# Patient Record
Sex: FEMALE | Race: WHITE | Employment: FULL TIME | URBAN - METROPOLITAN AREA
[De-identification: names, ages, dates, MRNs, and addresses within clinical notes are randomized per-mention and may not be internally consistent; named-entity substitution may affect disease eponyms.]

---

## 2017-03-09 ENCOUNTER — TELEPHONE (OUTPATIENT)
Dept: OBGYN CLINIC | Facility: CLINIC | Age: 27
End: 2017-03-09

## 2017-04-17 ENCOUNTER — OFFICE VISIT (OUTPATIENT)
Dept: OBGYN CLINIC | Facility: CLINIC | Age: 27
End: 2017-04-17

## 2017-04-17 VITALS
DIASTOLIC BLOOD PRESSURE: 72 MMHG | BODY MASS INDEX: 25.46 KG/M2 | HEART RATE: 67 BPM | HEIGHT: 64.5 IN | WEIGHT: 151 LBS | SYSTOLIC BLOOD PRESSURE: 109 MMHG

## 2017-04-17 DIAGNOSIS — R10.2 PELVIC PAIN IN FEMALE: ICD-10-CM

## 2017-04-17 DIAGNOSIS — N89.8 FOUL SMELLING VAGINAL DISCHARGE: ICD-10-CM

## 2017-04-17 DIAGNOSIS — R87.612 PAPANICOLAOU SMEAR OF CERVIX WITH LOW GRADE SQUAMOUS INTRAEPITHELIAL LESION (LGSIL): ICD-10-CM

## 2017-04-17 DIAGNOSIS — Z30.431 IUD CHECK UP: ICD-10-CM

## 2017-04-17 DIAGNOSIS — Z01.419 ENCOUNTER FOR GYNECOLOGICAL EXAMINATION WITHOUT ABNORMAL FINDING: Primary | ICD-10-CM

## 2017-04-17 DIAGNOSIS — N92.3 INTERMENSTRUAL BLEEDING: ICD-10-CM

## 2017-04-17 PROCEDURE — 99213 OFFICE O/P EST LOW 20 MIN: CPT | Performed by: OBSTETRICS & GYNECOLOGY

## 2017-04-17 PROCEDURE — 99395 PREV VISIT EST AGE 18-39: CPT | Performed by: OBSTETRICS & GYNECOLOGY

## 2017-04-17 NOTE — PROGRESS NOTES
Mini Rodríguez is a 27year old female  Patient's last menstrual period was 2017. Patient presents with:  Gyn Exam: ANNUAL EXAM  Vaginal Problem: constant vaginal odor. Has random spotting. odor not linked w/ coitus.  Current partner w/ vasect Comment: seldom    Drug Use: No    Sexual Activity: Yes    Birth Control/ Protection: Mirena     Other Topics Concern    Caffeine Concern No     Social History Narrative       FAMILY HISTORY:  Family History   Problem Relation Age of Onset   • Breast Cance masses, tenderness, asymmetry, nipple discharge, nipple retraction or skin changes  Abdomen:  soft, nontender, nondistended, no masses  Skin/Hair: no unusual rashes or bruises  Extremities: no edema, no cyanosis  Psychiatric:  Oriented to time, place, pers

## 2017-04-24 NOTE — PROGRESS NOTES
Quick Note:    Pt advised of results and recs and verbalized understanding.  Pt transferred to [facility] to schedule appt.  ______

## 2017-06-05 ENCOUNTER — TELEPHONE (OUTPATIENT)
Dept: OBGYN CLINIC | Facility: CLINIC | Age: 27
End: 2017-06-05

## 2017-06-05 NOTE — TELEPHONE ENCOUNTER
Pt had to miki her colpo was chicho for td and she missed appt miki w her .  Dr Deo Parker for wednesday; would like to know how much can she eat before or anything she has to prepare for? Okay to lv  if no answer.  [phone]

## 2017-06-06 NOTE — TELEPHONE ENCOUNTER
Pt asking if there is anything special she should do to prep for her colpo. Pt states she has a very low pain tolerance and her IUD insertion was very painful. Advised pt to take 600 mg of motrin one hour prior to her appt with some food.  Other than that s

## 2017-06-07 ENCOUNTER — OFFICE VISIT (OUTPATIENT)
Dept: OBGYN CLINIC | Facility: CLINIC | Age: 27
End: 2017-06-07

## 2017-06-07 VITALS
HEART RATE: 62 BPM | DIASTOLIC BLOOD PRESSURE: 77 MMHG | SYSTOLIC BLOOD PRESSURE: 114 MMHG | WEIGHT: 147.19 LBS | BODY MASS INDEX: 25 KG/M2

## 2017-06-07 DIAGNOSIS — Z30.431 IUD CHECK UP: ICD-10-CM

## 2017-06-07 DIAGNOSIS — R10.2 PELVIC PAIN: ICD-10-CM

## 2017-06-07 DIAGNOSIS — R87.612 PAPANICOLAOU SMEAR OF CERVIX WITH LOW GRADE SQUAMOUS INTRAEPITHELIAL LESION (LGSIL): Primary | ICD-10-CM

## 2017-06-07 PROCEDURE — 88305 TISSUE EXAM BY PATHOLOGIST: CPT | Performed by: OBSTETRICS & GYNECOLOGY

## 2017-06-07 PROCEDURE — 57454 BX/CURETT OF CERVIX W/SCOPE: CPT | Performed by: OBSTETRICS & GYNECOLOGY

## 2017-06-07 NOTE — PROCEDURES
Colpo w/Cx Biopsy and ECC      Birth control method(s) used: Mirena IUD    Consent signed. Procedure discussed with patient in detail including indication, risk, benefits, alternatives and complications.     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

## 2017-06-16 ENCOUNTER — TELEPHONE (OUTPATIENT)
Dept: OBGYN CLINIC | Facility: CLINIC | Age: 27
End: 2017-06-16

## 2017-06-18 NOTE — PROGRESS NOTES
Quick Note:    Inform pt that colpo CHRIS 1. Next step is repeating pap w/ HPV screen in 12 mos. Make appt now for 12 mos as \"annual / cotest\".   ______

## 2017-06-26 RX ORDER — KETOCONAZOLE 20 MG/ML
SHAMPOO TOPICAL
Qty: 120 ML | Refills: 0 | Status: SHIPPED | OUTPATIENT
Start: 2017-06-26 | End: 2020-09-14

## 2017-07-10 ENCOUNTER — TELEPHONE (OUTPATIENT)
Dept: OBGYN CLINIC | Facility: CLINIC | Age: 27
End: 2017-07-10

## 2017-07-10 NOTE — TELEPHONE ENCOUNTER
Pt stated that her last herpes outbreak was about 1 month ago. Pt stated that she is going through a move and \"a lot of other stressful things\" and wants to be \"preventative and proactive\" and have a refill of Valtrex now. Pt denies current outbreak.  P

## 2017-07-11 RX ORDER — VALACYCLOVIR HYDROCHLORIDE 500 MG/1
500 TABLET, FILM COATED ORAL 2 TIMES DAILY
Qty: 30 TABLET | Refills: 0 | Status: SHIPPED | OUTPATIENT
Start: 2017-07-11 | End: 2018-05-21 | Stop reason: ALTCHOICE

## 2017-07-31 ENCOUNTER — HOSPITAL ENCOUNTER (OUTPATIENT)
Dept: ULTRASOUND IMAGING | Age: 27
Discharge: HOME | End: 2017-07-31
Attending: OBSTETRICS & GYNECOLOGY
Payer: COMMERCIAL

## 2017-07-31 DIAGNOSIS — Z30.431 IUD CHECK UP: ICD-10-CM

## 2017-07-31 DIAGNOSIS — R10.2 PELVIC PAIN: ICD-10-CM

## 2017-07-31 PROCEDURE — 76856 US EXAM PELVIC COMPLETE: CPT | Performed by: OBSTETRICS & GYNECOLOGY

## 2017-07-31 PROCEDURE — 76830 TRANSVAGINAL US NON-OB: CPT | Performed by: OBSTETRICS & GYNECOLOGY

## 2018-05-19 ENCOUNTER — TELEPHONE (OUTPATIENT)
Dept: OBGYN CLINIC | Facility: CLINIC | Age: 28
End: 2018-05-19

## 2018-05-19 NOTE — TELEPHONE ENCOUNTER
Pt requesting Valtrex rx refill. Denies a current outbreak but want to have a rx on hand. States she started a new job as a  and will be flying soon.  Informed pt nurse will route message to [facility] and we will call her as soon as we have a resp

## 2018-05-21 RX ORDER — VALACYCLOVIR HYDROCHLORIDE 500 MG/1
500 TABLET, FILM COATED ORAL 2 TIMES DAILY
Qty: 6 TABLET | Refills: 0 | Status: SHIPPED | OUTPATIENT
Start: 2018-05-21 | End: 2020-09-14

## 2018-05-21 NOTE — TELEPHONE ENCOUNTER
Lm stating [facility] authorized rx refill and it has been sent to pt's pharmacy. Pt to call us if she has any questions.

## 2018-09-10 ENCOUNTER — TELEPHONE (OUTPATIENT)
Dept: OBGYN CLINIC | Facility: CLINIC | Age: 28
End: 2018-09-10

## 2018-09-10 NOTE — TELEPHONE ENCOUNTER
GABRIEL. Had colpo 1 year ago and is due back for annual/cotest. We cancelled her June appt d/t NJG's schedule so please help her get an appt that is convenient for her.

## 2018-10-05 ENCOUNTER — TELEPHONE (OUTPATIENT)
Dept: OBGYN CLINIC | Facility: CLINIC | Age: 28
End: 2018-10-05

## 2018-10-05 NOTE — TELEPHONE ENCOUNTER
Pt informed of NOMI's recs below. Pt aware that she is 17 months overdue for her Annual.  Informed pt that she could see a gyne where she is located or go to urgent care where her insurance allows.

## 2018-10-05 NOTE — TELEPHONE ENCOUNTER
Pt is having a herpes outbreak, which started yesterday. She is a  and lives in [place]. Last Annual 4/2017. Pt aware that she is due for an Annual.  Pt states that she is will either schedule it here at [place] or [place].   Pt states that she d

## 2018-10-05 NOTE — TELEPHONE ENCOUNTER
Pt states she is going to schedule an Annual.  It will either be here in [facility] with MD or in [address] [facility]. She states that she does have lay overs in [place] [facility], so she could still continue care with us if she decides to do Annual here.   She did not say she will not

## 2019-07-25 ENCOUNTER — TELEPHONE (OUTPATIENT)
Dept: INTERNAL MEDICINE CLINIC | Facility: CLINIC | Age: 29
End: 2019-07-25

## 2019-07-25 ENCOUNTER — OFFICE VISIT (OUTPATIENT)
Dept: INTERNAL MEDICINE CLINIC | Facility: CLINIC | Age: 29
End: 2019-07-25
Payer: COMMERCIAL

## 2019-07-25 VITALS
RESPIRATION RATE: 14 BRPM | HEART RATE: 60 BPM | DIASTOLIC BLOOD PRESSURE: 80 MMHG | BODY MASS INDEX: 26.12 KG/M2 | HEIGHT: 64.3 IN | WEIGHT: 153 LBS | OXYGEN SATURATION: 98 % | TEMPERATURE: 98 F | SYSTOLIC BLOOD PRESSURE: 110 MMHG

## 2019-07-25 DIAGNOSIS — Z00.00 ANNUAL PHYSICAL EXAM: Primary | ICD-10-CM

## 2019-07-25 DIAGNOSIS — Z71.84 TRAVEL ADVICE ENCOUNTER: ICD-10-CM

## 2019-07-25 PROCEDURE — 99395 PREV VISIT EST AGE 18-39: CPT | Performed by: INTERNAL MEDICINE

## 2019-07-25 NOTE — TELEPHONE ENCOUNTER
Please call patient and let her know that after she left I felt that I should have recommended that at her leisure she see dermatology with her father's history of melanoma.   She can see Dr. Mone Hawley or Dr. Alejandra Ibanez for regular skin exam.  I thought this wou

## 2019-07-25 NOTE — PROGRESS NOTES
Shahram Arreola is a 25year old female. HPI:   Patient presents with:  Physical: New patient Px, patient wants to discuss being up to day with vaccinations     Patient here for annual physical.  She feels well. She has no new complaints.   She has a te Use      Smoking status: Never Smoker      Smokeless tobacco: Never Used    Alcohol use:  Yes      Alcohol/week: 0.0 standard drinks      Comment: social    Drug use: No       REVIEW OF SYSTEMS:   GENERAL HEALTH:  feels well otherwise  RESPIRATORY:  Voices

## 2019-07-25 NOTE — TELEPHONE ENCOUNTER
Called and Relayed MD's message to patient---verbalized understanding  Contact information provided for Dr. Rohini Caldwell.

## 2019-08-15 ENCOUNTER — OFFICE VISIT (OUTPATIENT)
Dept: OBGYN CLINIC | Facility: CLINIC | Age: 29
End: 2019-08-15
Payer: COMMERCIAL

## 2019-08-15 VITALS
HEART RATE: 60 BPM | DIASTOLIC BLOOD PRESSURE: 83 MMHG | SYSTOLIC BLOOD PRESSURE: 136 MMHG | BODY MASS INDEX: 26.15 KG/M2 | WEIGHT: 153.19 LBS | HEIGHT: 64.3 IN

## 2019-08-15 DIAGNOSIS — N89.8 VAGINAL ODOR: ICD-10-CM

## 2019-08-15 DIAGNOSIS — Z12.4 SCREENING FOR MALIGNANT NEOPLASM OF CERVIX: ICD-10-CM

## 2019-08-15 DIAGNOSIS — Z01.419 WELL WOMAN EXAM: Primary | ICD-10-CM

## 2019-08-15 DIAGNOSIS — Z30.432 ENCOUNTER FOR IUD REMOVAL: ICD-10-CM

## 2019-08-15 PROCEDURE — 99395 PREV VISIT EST AGE 18-39: CPT | Performed by: OBSTETRICS & GYNECOLOGY

## 2019-08-15 PROCEDURE — 58301 REMOVE INTRAUTERINE DEVICE: CPT | Performed by: OBSTETRICS & GYNECOLOGY

## 2019-08-16 ENCOUNTER — TELEPHONE (OUTPATIENT)
Dept: OBGYN CLINIC | Facility: CLINIC | Age: 29
End: 2019-08-16

## 2019-08-16 LAB — HPV I/H RISK 1 DNA SPEC QL NAA+PROBE: POSITIVE

## 2019-08-16 RX ORDER — METRONIDAZOLE 500 MG/1
500 TABLET ORAL 2 TIMES DAILY
Qty: 14 TABLET | Refills: 0 | Status: SHIPPED | OUTPATIENT
Start: 2019-08-16 | End: 2019-08-23

## 2019-08-16 NOTE — TELEPHONE ENCOUNTER
----- Message from Lali Tanner DO sent at 8/16/2019  7:20 AM CDT -----  Please notify of +BV. Needs flagyl or metrogel.   Review etoh precautions

## 2019-08-16 NOTE — TELEPHONE ENCOUNTER
Pt informed of results and recs. Pt would like flagyl. Pt states she lived in [place] and will be back there tomorrow evening. She would like rx sent there.   She states she will be going ot a wedding this weekend and will be drinking, so she will start the m

## 2019-08-17 LAB
GENITAL VAGINOSIS SCREEN: POSITIVE
TRICHOMONAS SCREEN: NEGATIVE

## 2019-08-19 ENCOUNTER — TELEPHONE (OUTPATIENT)
Dept: OBGYN CLINIC | Facility: CLINIC | Age: 29
End: 2019-08-19

## 2019-08-19 DIAGNOSIS — Z32.02 PREGNANCY EXAMINATION OR TEST, NEGATIVE RESULT: Primary | ICD-10-CM

## 2019-08-19 NOTE — TELEPHONE ENCOUNTER
----- Message from Tony Gaytan DO sent at 8/19/2019 12:58 PM CDT -----  ASCUS/HPV+. Needs colpo. Schedule after she completed BV tx. I removed Mirena at last visit so will need serum HCG the day before.

## 2019-08-30 NOTE — TELEPHONE ENCOUNTER
Notified pt of results and recs. Pt had abnormal Pap before and does not have any questions. Colpo booked on 9/11 and pt is aware to do serum hcg the day before appt.

## 2019-09-07 NOTE — TELEPHONE ENCOUNTER
Pt informed she does not need to fast for her blood work. Pt wondering if the blood work is checking for everything. Pt informed it is only a preg test.  Pt asked if we can check everything. I asked pt what specifically she wants tested.   PT states her

## 2019-09-07 NOTE — TELEPHONE ENCOUNTER
Pt needs to come in to labs day before her colpo schd on 9/11, would like to know if she needs to fast or do anything specific before going to lab Monday evening.

## 2019-09-09 ENCOUNTER — APPOINTMENT (OUTPATIENT)
Dept: LAB | Facility: HOSPITAL | Age: 29
End: 2019-09-09
Attending: OBSTETRICS & GYNECOLOGY
Payer: COMMERCIAL

## 2019-09-09 DIAGNOSIS — Z32.02 PREGNANCY EXAMINATION OR TEST, NEGATIVE RESULT: ICD-10-CM

## 2019-09-09 LAB — HCG SERPL QL: NEGATIVE

## 2019-09-09 PROCEDURE — 36415 COLL VENOUS BLD VENIPUNCTURE: CPT

## 2019-09-09 PROCEDURE — 84703 CHORIONIC GONADOTROPIN ASSAY: CPT

## 2019-09-10 ENCOUNTER — OFFICE VISIT (OUTPATIENT)
Dept: OBGYN CLINIC | Facility: CLINIC | Age: 29
End: 2019-09-10
Payer: COMMERCIAL

## 2019-09-10 VITALS
WEIGHT: 154 LBS | BODY MASS INDEX: 26.29 KG/M2 | HEART RATE: 77 BPM | HEIGHT: 64 IN | DIASTOLIC BLOOD PRESSURE: 84 MMHG | SYSTOLIC BLOOD PRESSURE: 128 MMHG

## 2019-09-10 DIAGNOSIS — R87.610 ATYPICAL SQUAMOUS CELL CHANGES OF UNDETERMINED SIGNIFICANCE (ASCUS) ON CERVICAL CYTOLOGY WITH POSITIVE HIGH RISK HUMAN PAPILLOMA VIRUS (HPV): Primary | ICD-10-CM

## 2019-09-10 DIAGNOSIS — R87.810 ATYPICAL SQUAMOUS CELL CHANGES OF UNDETERMINED SIGNIFICANCE (ASCUS) ON CERVICAL CYTOLOGY WITH POSITIVE HIGH RISK HUMAN PAPILLOMA VIRUS (HPV): Primary | ICD-10-CM

## 2019-09-10 PROCEDURE — 57454 BX/CURETT OF CERVIX W/SCOPE: CPT | Performed by: OBSTETRICS & GYNECOLOGY

## 2019-09-10 NOTE — PROCEDURES
Colpo w/Cx Biopsy and ECC    Pregnancy Results: negative from blood test   Birth control method(s) used: n/a    Consent signed. Procedure discussed with patient in detail including indication, risk, benefits, alternatives and complications.     Indicatio

## 2020-04-03 ENCOUNTER — TELEPHONE (OUTPATIENT)
Dept: INTERNAL MEDICINE CLINIC | Facility: CLINIC | Age: 30
End: 2020-04-03

## 2020-04-03 NOTE — TELEPHONE ENCOUNTER
Please call pt  She is a , wondering if she can get tested for COVID-19  This past week pt said her chest was heavy and tight (not normal for her), no fever, no cough  Tasked to nursing

## 2020-04-03 NOTE — TELEPHONE ENCOUNTER
Respiratory infection triage:    Fever:  [x]  No fever  []  Fever>100.4    Cough:  [] Tight cough  [] Cough with exertion  [] Dry cough  [] Sputum production,   Breathing:  [x] Mild shortness of breath interfering with activity last week[] Wheezing  [x] Pain with deep breathing last week[] Using inhaler    Other symptoms:  [] Sore throat  [] Difficulty swallowing  [] Nasal drainage/congestion  [] Sinus congestion/pressure  [] Ear pain  [x] Body aches back and ribs[] Poor appetite  [] Loss of sense of smell   [] Loss of sense of taste  []Conjunctivitis? [x] Any recent travel?last trip 3/30 from [place] to [place] ( 6 different flights)[] Any sick contacts?unknown  [] Are you a healthcare worker? Patient is a  -should start work tomorrow and wants to know what to do ,patient also states she feels like she has a lump in the throat    ADDITIONAL NOTES:            Notified patient that we will route this message to the doctor and see what their recommendations would be. In the meantime, if anything worsens, they were advised to call back or seek emergent evaluation.

## 2020-04-03 NOTE — TELEPHONE ENCOUNTER
Discussed with patient. She is a . She has not traveled out of the country. She has not had any obvious exposure to cold. She has traveled back and forth to [place]. She has had no fevers. No coughing. She has had some shortness of breath that was associated with some \"chest tightness\" for 2 days but now today feels well. She has history of blood clots. Is a  she is not sedentary anyway. She has had some body aches but again feels well today. I told her that with current recommendations she would not qualify for COVID 19 test that is available through [facility].  She was comfortable with that. She feels well today and will monitor for any changing symptoms. I also discussed with her that I did think about blood clots but yet since she is not sedentary and her symptoms have resolved for now I do not think she needs to be tested for DVT or PE.   She was very comfortable with this thought process and will call if she gets any other symptoms

## 2020-05-30 ENCOUNTER — TELEPHONE (OUTPATIENT)
Dept: OBGYN CLINIC | Facility: CLINIC | Age: 30
End: 2020-05-30

## 2020-06-02 NOTE — TELEPHONE ENCOUNTER
Pt wanting an IUD, Mirena. Pt has annual 8-15-19 and had IUD removed. Pt has next annual scheduled 8/2020. Pt is not using any birth control now. Sent to [address] [place] [address] to call with day one and schedule IUD. Do you want a hcg the day before the IUD insertion? Ascus Pap and +HPV, 9/10/19 Neg Colpo  Informed pt to check for insurance coverage for IUD.

## 2020-06-03 RX ORDER — MISOPROSTOL 200 UG/1
TABLET ORAL
Qty: 1 TABLET | Refills: 0 | Status: SHIPPED | OUTPATIENT
Start: 2020-06-03 | End: 2020-09-14

## 2020-07-17 ENCOUNTER — TELEPHONE (OUTPATIENT)
Dept: OBGYN CLINIC | Facility: CLINIC | Age: 30
End: 2020-07-17

## 2020-07-17 NOTE — TELEPHONE ENCOUNTER
Would like to schedule Mirena IUD insertion. Pt states she spoke with insurance and they do cover the device.  LMP 7/1    Pls advise

## 2020-07-17 NOTE — TELEPHONE ENCOUNTER
Informed pt that she would need to call with next period day one for the IUD insertion. Pt states she will call with day one. Pt will need to take the cytotec the night before her IUD insertion. Pt will need to do an upt the day of IUD.       Note below

## 2020-07-29 NOTE — TELEPHONE ENCOUNTER
Pt states that she will take the appt on Monday, 8/3 at noon. She stated by some chance she can not get off, she will call and cancel the appt .      Started to remind pt of taking Cytotec the night before and she stated she is on the bus and will call devan

## 2020-08-03 ENCOUNTER — OFFICE VISIT (OUTPATIENT)
Dept: OBGYN CLINIC | Facility: CLINIC | Age: 30
End: 2020-08-03
Payer: COMMERCIAL

## 2020-08-03 VITALS — WEIGHT: 159.63 LBS | BODY MASS INDEX: 27 KG/M2

## 2020-08-03 DIAGNOSIS — Z30.430 ENCOUNTER FOR IUD INSERTION: Primary | ICD-10-CM

## 2020-08-03 DIAGNOSIS — Z32.02 PREGNANCY EXAMINATION OR TEST, NEGATIVE RESULT: ICD-10-CM

## 2020-08-03 LAB
CONTROL LINE PRESENT WITH A CLEAR BACKGROUND (YES/NO): YES YES/NO
KIT EXPIRATION DATE: NORMAL DATE
KIT LOT #: NORMAL NUMERIC

## 2020-08-03 PROCEDURE — 58300 INSERT INTRAUTERINE DEVICE: CPT | Performed by: OBSTETRICS & GYNECOLOGY

## 2020-08-03 PROCEDURE — 81025 URINE PREGNANCY TEST: CPT | Performed by: OBSTETRICS & GYNECOLOGY

## 2020-08-03 NOTE — PROCEDURES
IUD Insertion     Pregnancy Results: negative from urine test   Birth control method(s) used: None. LMP: 7/29/20  Consent signed. Procedure discussed with the patient in detail including indication, risks, benefits, alternatives and complications.     Pe

## 2020-09-03 ENCOUNTER — OFFICE VISIT (OUTPATIENT)
Dept: OBGYN CLINIC | Facility: CLINIC | Age: 30
End: 2020-09-03
Payer: COMMERCIAL

## 2020-09-03 VITALS
SYSTOLIC BLOOD PRESSURE: 126 MMHG | HEART RATE: 71 BPM | DIASTOLIC BLOOD PRESSURE: 86 MMHG | BODY MASS INDEX: 27 KG/M2 | WEIGHT: 157.19 LBS

## 2020-09-03 DIAGNOSIS — Z01.419 WELL WOMAN EXAM: Primary | ICD-10-CM

## 2020-09-03 DIAGNOSIS — T83.32XA INTRAUTERINE CONTRACEPTIVE DEVICE THREADS LOST, INITIAL ENCOUNTER: ICD-10-CM

## 2020-09-03 DIAGNOSIS — Z12.4 CERVICAL CANCER SCREENING: ICD-10-CM

## 2020-09-03 PROCEDURE — 3079F DIAST BP 80-89 MM HG: CPT | Performed by: OBSTETRICS & GYNECOLOGY

## 2020-09-03 PROCEDURE — 99395 PREV VISIT EST AGE 18-39: CPT | Performed by: OBSTETRICS & GYNECOLOGY

## 2020-09-03 PROCEDURE — 3074F SYST BP LT 130 MM HG: CPT | Performed by: OBSTETRICS & GYNECOLOGY

## 2020-09-03 NOTE — H&P
HPI:  The patient is a 31 yo F here for WWE. NO GYN c/o. Some brown dc since IUD placed. No pain or cramping. NO IC. Pt considering breast reduction.       LPS: 8/15/19 ASCUS HPV+ with neg colpo    Reviewed medical and surgical history below       OBST activity:        Days per week: Not on file        Minutes per session: Not on file      Stress: Not on file    Relationships      Social connections:        Talks on phone: Not on file        Gets together: Not on file        Attends Qu[employer] service: No taking: Reported on 8/3/2020 ), Disp: 6 tablet, Rfl: 0  •  KETOCONAZOLE 2 % External Shampoo, USE 2-3 TIMES A WEEK AS DIRECTED (Patient not taking: Reported on 8/3/2020), Disp: 120 mL, Rfl: 0    ALLERGIES:    Sulfa Antibiotics       HIVES      Review of Sy exam.    Diagnoses and all orders for this visit:    Well woman exam    Cervical cancer screening    Intrauterine contraceptive device threads lost, initial encounter  -     US PELVIS W EV (CPT=76856/05907); Future        1. WWE:   1.  Reviewed ASCCP guidel

## 2020-09-06 LAB — HPV I/H RISK 1 DNA SPEC QL NAA+PROBE: POSITIVE

## 2020-09-09 ENCOUNTER — TELEPHONE (OUTPATIENT)
Dept: OBGYN CLINIC | Facility: CLINIC | Age: 30
End: 2020-09-09

## 2020-09-09 NOTE — TELEPHONE ENCOUNTER
Pt informed of MAZs recs and verbalized understanding. Pt scheduled colpo appt with [address] [facility] [address] for 9/14. Pt advised to take 600mg ibuprofen about 30-60 min prior to appt. Pt has mirena IUD.  Pt advised NOMI may want UPT upon arrival to appt so be ready to give urin

## 2020-09-09 NOTE — TELEPHONE ENCOUNTER
----- Message from Emily Davila DO sent at 9/9/2020 11:42 AM CDT -----  LSIL/HPV+. Needs colpo ASAP.   Losing insurance at the end of the month so I want to get her in, in case any further interventions need to occur

## 2020-09-14 ENCOUNTER — OFFICE VISIT (OUTPATIENT)
Dept: OBGYN CLINIC | Facility: CLINIC | Age: 30
End: 2020-09-14
Payer: COMMERCIAL

## 2020-09-14 VITALS
BODY MASS INDEX: 27 KG/M2 | DIASTOLIC BLOOD PRESSURE: 83 MMHG | HEART RATE: 72 BPM | WEIGHT: 155 LBS | SYSTOLIC BLOOD PRESSURE: 132 MMHG

## 2020-09-14 DIAGNOSIS — R87.810 CERVICAL HIGH RISK HUMAN PAPILLOMAVIRUS (HPV) DNA TEST POSITIVE: ICD-10-CM

## 2020-09-14 DIAGNOSIS — R87.612 PAPANICOLAOU SMEAR OF CERVIX WITH LOW GRADE SQUAMOUS INTRAEPITHELIAL LESION (LGSIL): Primary | ICD-10-CM

## 2020-09-14 PROCEDURE — 57454 BX/CURETT OF CERVIX W/SCOPE: CPT | Performed by: OBSTETRICS & GYNECOLOGY

## 2020-09-14 PROCEDURE — 3079F DIAST BP 80-89 MM HG: CPT | Performed by: OBSTETRICS & GYNECOLOGY

## 2020-09-14 PROCEDURE — 3075F SYST BP GE 130 - 139MM HG: CPT | Performed by: OBSTETRICS & GYNECOLOGY

## 2020-09-15 ENCOUNTER — TELEPHONE (OUTPATIENT)
Dept: OBGYN CLINIC | Facility: CLINIC | Age: 30
End: 2020-09-15

## 2020-09-15 NOTE — TELEPHONE ENCOUNTER
Please cancel james's US appt for IUD check.   I was able to see the strings during her colpo yesterday

## 2020-09-15 NOTE — PROCEDURES
Colpo w/Cx Biopsy and ECC    Birth control method(s) used: Mirena IUD    Consent signed. Procedure discussed with patient in detail including indication, risk, benefits, alternatives and complications. Indication: LSIL/HPV+    Procedure:   The patient

## 2020-09-22 ENCOUNTER — TELEPHONE (OUTPATIENT)
Dept: OBGYN CLINIC | Facility: CLINIC | Age: 30
End: 2020-09-22

## 2020-09-22 DIAGNOSIS — Z20.822 ENCOUNTER FOR PREPROCEDURE SCREENING LABORATORY TESTING FOR COVID-19: Primary | ICD-10-CM

## 2020-09-22 DIAGNOSIS — Z01.812 ENCOUNTER FOR PREPROCEDURE SCREENING LABORATORY TESTING FOR COVID-19: Primary | ICD-10-CM

## 2020-09-22 NOTE — TELEPHONE ENCOUNTER
Assisted pt with scheduling appt for Leep for 9/28/2020 at 1120am with [address] Covid test ordered. Pt aware scheduling will contact pt to schedule Covid test. Advised pt if she does not hear from scheduling bu 9/24/2020 to let us know.  Pt verbalized Shields

## 2020-09-22 NOTE — TELEPHONE ENCOUNTER
----- Message from Steve Taylor DO sent at 9/22/2020  1:21 PM CDT -----  LSIL/HPV+ pap with CHRIS 1 and 2 on colpo. Long standing history of abnormal paps. Discussed surveillance with pap/colpo at 6 and 12 months vs LEEP due to CIN2. R/B reviewed.   P

## 2020-09-25 ENCOUNTER — LAB ENCOUNTER (OUTPATIENT)
Dept: LAB | Facility: HOSPITAL | Age: 30
End: 2020-09-25
Attending: OBSTETRICS & GYNECOLOGY
Payer: COMMERCIAL

## 2020-09-25 DIAGNOSIS — Z01.812 ENCOUNTER FOR PREPROCEDURE SCREENING LABORATORY TESTING FOR COVID-19: ICD-10-CM

## 2020-09-25 DIAGNOSIS — Z20.822 ENCOUNTER FOR PREPROCEDURE SCREENING LABORATORY TESTING FOR COVID-19: ICD-10-CM

## 2020-09-28 ENCOUNTER — LAB ENCOUNTER (OUTPATIENT)
Dept: LAB | Facility: HOSPITAL | Age: 30
End: 2020-09-28
Attending: OBSTETRICS & GYNECOLOGY
Payer: COMMERCIAL

## 2020-09-28 ENCOUNTER — TELEPHONE (OUTPATIENT)
Dept: OBGYN CLINIC | Facility: CLINIC | Age: 30
End: 2020-09-28

## 2020-09-28 DIAGNOSIS — Z20.822 ENCOUNTER FOR PREPROCEDURE SCREENING LABORATORY TESTING FOR COVID-19: ICD-10-CM

## 2020-09-28 DIAGNOSIS — Z01.812 ENCOUNTER FOR PREPROCEDURE SCREENING LABORATORY TESTING FOR COVID-19: ICD-10-CM

## 2020-09-28 LAB — SARS-COV-2 BY PCR: NOT DETECTED

## 2020-09-28 NOTE — TELEPHONE ENCOUNTER
NOMI stated that the results are not back for Covid. Pt is having a LEEP today with NOMI. If results are not back by 10:30 am today we will have to reschedule tomorrow per NOMI.   Pt will call back today at 10:30 am.

## 2020-09-28 NOTE — TELEPHONE ENCOUNTER
Wilbur Brewer stated that can not cancel the [facility] lab, because it is in process. Wilbur Brewer states they will have to credit her.

## 2020-09-28 NOTE — TELEPHONE ENCOUNTER
Appt canceled for today for the LEEP. Appt rescheduled  for tomorrow on 9/29 at 11:30 for a LEEP procedure. (Scheduled or LEEP in procedure and on NOMI's schedule.) . Called Maryse Rodriguez to cancel the Covid done with [facility], so pt will not be charged.

## 2020-09-28 NOTE — TELEPHONE ENCOUNTER
Amparo Pierre from the lab states that the Covid will not be available until tomorrow or Friday. She stated that the lab could do a Rapid today and they would order for the pt.       Sam stated to schedule the Leep for tomorrow at 11:30 am  Pt aware that she will

## 2020-09-29 ENCOUNTER — OFFICE VISIT (OUTPATIENT)
Dept: OBGYN CLINIC | Facility: CLINIC | Age: 30
End: 2020-09-29
Payer: COMMERCIAL

## 2020-09-29 VITALS
BODY MASS INDEX: 27 KG/M2 | HEART RATE: 64 BPM | SYSTOLIC BLOOD PRESSURE: 115 MMHG | WEIGHT: 159.19 LBS | DIASTOLIC BLOOD PRESSURE: 76 MMHG

## 2020-09-29 DIAGNOSIS — N87.1 CIN II (CERVICAL INTRAEPITHELIAL NEOPLASIA II): Primary | ICD-10-CM

## 2020-09-29 PROCEDURE — 57522 CONIZATION OF CERVIX: CPT | Performed by: OBSTETRICS & GYNECOLOGY

## 2020-09-29 PROCEDURE — 3074F SYST BP LT 130 MM HG: CPT | Performed by: OBSTETRICS & GYNECOLOGY

## 2020-09-29 PROCEDURE — 3078F DIAST BP <80 MM HG: CPT | Performed by: OBSTETRICS & GYNECOLOGY

## 2020-09-29 RX ORDER — LIDOCAINE HYDROCHLORIDE AND EPINEPHRINE BITARTRATE 20; .01 MG/ML; MG/ML
1.7 INJECTION, SOLUTION SUBCUTANEOUS ONCE
Status: SHIPPED | OUTPATIENT
Start: 2020-09-29

## 2020-09-29 NOTE — PROCEDURES
Colpo with Leep Cone Biopsy    Birth control method(s) used: IUD - Mirena    Consent signed. Procedure discussed with patient in detail including indication, risk, benefits, alternatives and complications.     Pre-Procedure:  Pre-Meds:  Ibuprofen    Cerv

## 2020-10-02 ENCOUNTER — TELEPHONE (OUTPATIENT)
Dept: OBGYN CLINIC | Facility: CLINIC | Age: 30
End: 2020-10-02

## 2020-10-02 NOTE — TELEPHONE ENCOUNTER
Patient calling back and stated Dr. Lupe Nova tried contacting her to go over some results? Stated this was yesterday. I don not see anything in regards to this.      Please advise

## 2020-10-02 NOTE — TELEPHONE ENCOUNTER
Pt aware Mortimer Lapidus will be back in the office Monday. States he left message that things looked ok but he wanted to explain. Pt is not worried and will await his call Monday.

## 2020-12-17 NOTE — TELEPHONE ENCOUNTER
Pt already cancelled US appt via my chart. Burow's Graft Text: The defect edges were debeveled with a #15 scalpel blade.  Given the location of the defect, shape of the defect, the proximity to free margins and the presence of a standing cone deformity a Burow's skin graft was deemed most appropriate. The standing cone was removed and this tissue was then trimmed to the shape of the primary defect. The adipose tissue was also removed until only dermis and epidermis were left.  The skin margins of the secondary defect were undermined to an appropriate distance in all directions utilizing iris scissors.  The secondary defect was closed with interrupted buried subcutaneous sutures.  The skin edges were then re-apposed with running  sutures.  The skin graft was then placed in the primary defect and oriented appropriately.

## 2021-03-15 DIAGNOSIS — Z23 NEED FOR VACCINATION: ICD-10-CM

## 2021-09-01 ENCOUNTER — TELEPHONE (OUTPATIENT)
Dept: INTERNAL MEDICINE CLINIC | Facility: CLINIC | Age: 31
End: 2021-09-01

## 2021-09-01 DIAGNOSIS — Z00.00 ANNUAL PHYSICAL EXAM: Primary | ICD-10-CM

## 2021-09-01 NOTE — TELEPHONE ENCOUNTER
Patient called today to scheduled her annual physical with Dr Zahida Gutierrez. Patient scheduled an annual physical for 9/13 at 0830. Patient is requesting an order for blood work to be placed to get done before her appointment.

## 2021-09-13 ENCOUNTER — LAB ENCOUNTER (OUTPATIENT)
Dept: LAB | Age: 31
End: 2021-09-13
Attending: INTERNAL MEDICINE
Payer: COMMERCIAL

## 2021-09-13 ENCOUNTER — OFFICE VISIT (OUTPATIENT)
Dept: INTERNAL MEDICINE CLINIC | Facility: CLINIC | Age: 31
End: 2021-09-13
Payer: COMMERCIAL

## 2021-09-13 VITALS
TEMPERATURE: 98 F | HEART RATE: 80 BPM | BODY MASS INDEX: 24.59 KG/M2 | HEIGHT: 64 IN | WEIGHT: 144 LBS | SYSTOLIC BLOOD PRESSURE: 106 MMHG | DIASTOLIC BLOOD PRESSURE: 68 MMHG | OXYGEN SATURATION: 99 %

## 2021-09-13 DIAGNOSIS — Z00.00 ANNUAL PHYSICAL EXAM: Primary | ICD-10-CM

## 2021-09-13 DIAGNOSIS — M54.50 LUMBAR BACK PAIN: ICD-10-CM

## 2021-09-13 DIAGNOSIS — Z00.00 ANNUAL PHYSICAL EXAM: ICD-10-CM

## 2021-09-13 DIAGNOSIS — G47.10 EXCESSIVE SLEEPINESS: ICD-10-CM

## 2021-09-13 LAB
ALBUMIN SERPL-MCNC: 4.2 G/DL (ref 3.4–5)
ALBUMIN/GLOB SERPL: 1.1 {RATIO} (ref 1–2)
ALP LIVER SERPL-CCNC: 45 U/L
ALT SERPL-CCNC: 27 U/L
ANION GAP SERPL CALC-SCNC: 6 MMOL/L (ref 0–18)
AST SERPL-CCNC: 23 U/L (ref 15–37)
BASOPHILS # BLD AUTO: 0.02 X10(3) UL (ref 0–0.2)
BASOPHILS NFR BLD AUTO: 0.4 %
BILIRUB SERPL-MCNC: 0.9 MG/DL (ref 0.1–2)
BUN BLD-MCNC: 10 MG/DL (ref 7–18)
BUN/CREAT SERPL: 11.5 (ref 10–20)
CALCIUM BLD-MCNC: 9.3 MG/DL (ref 8.5–10.1)
CHLORIDE SERPL-SCNC: 108 MMOL/L (ref 98–112)
CHOLEST SMN-MCNC: 204 MG/DL (ref ?–200)
CO2 SERPL-SCNC: 25 MMOL/L (ref 21–32)
CREAT BLD-MCNC: 0.87 MG/DL
DEPRECATED RDW RBC AUTO: 40.4 FL (ref 35.1–46.3)
EOSINOPHIL # BLD AUTO: 0.05 X10(3) UL (ref 0–0.7)
EOSINOPHIL NFR BLD AUTO: 0.9 %
ERYTHROCYTE [DISTWIDTH] IN BLOOD BY AUTOMATED COUNT: 12.3 % (ref 11–15)
GLOBULIN PLAS-MCNC: 3.8 G/DL (ref 2.8–4.4)
GLUCOSE BLD-MCNC: 94 MG/DL (ref 70–99)
HCT VFR BLD AUTO: 43.4 %
HDLC SERPL-MCNC: 62 MG/DL (ref 40–59)
HGB BLD-MCNC: 14.4 G/DL
IMM GRANULOCYTES # BLD AUTO: 0.01 X10(3) UL (ref 0–1)
IMM GRANULOCYTES NFR BLD: 0.2 %
LDLC SERPL CALC-MCNC: 131 MG/DL (ref ?–100)
LYMPHOCYTES # BLD AUTO: 1.54 X10(3) UL (ref 1–4)
LYMPHOCYTES NFR BLD AUTO: 27.7 %
M PROTEIN MFR SERPL ELPH: 8 G/DL (ref 6.4–8.2)
MCH RBC QN AUTO: 29.5 PG (ref 26–34)
MCHC RBC AUTO-ENTMCNC: 33.2 G/DL (ref 31–37)
MCV RBC AUTO: 88.9 FL
MONOCYTES # BLD AUTO: 0.65 X10(3) UL (ref 0.1–1)
MONOCYTES NFR BLD AUTO: 11.7 %
NEUTROPHILS # BLD AUTO: 3.28 X10 (3) UL (ref 1.5–7.7)
NEUTROPHILS # BLD AUTO: 3.28 X10(3) UL (ref 1.5–7.7)
NEUTROPHILS NFR BLD AUTO: 59.1 %
NONHDLC SERPL-MCNC: 142 MG/DL (ref ?–130)
OSMOLALITY SERPL CALC.SUM OF ELEC: 287 MOSM/KG (ref 275–295)
PATIENT FASTING Y/N/NP: YES
PATIENT FASTING Y/N/NP: YES
PLATELET # BLD AUTO: 225 10(3)UL (ref 150–450)
POTASSIUM SERPL-SCNC: 4.4 MMOL/L (ref 3.5–5.1)
RBC # BLD AUTO: 4.88 X10(6)UL
SODIUM SERPL-SCNC: 139 MMOL/L (ref 136–145)
TRIGL SERPL-MCNC: 61 MG/DL (ref 30–149)
TSI SER-ACNC: 1.24 MIU/ML (ref 0.36–3.74)
VIT B12 SERPL-MCNC: 715 PG/ML (ref 193–986)
VIT D+METAB SERPL-MCNC: 25.9 NG/ML (ref 30–100)
VLDLC SERPL CALC-MCNC: 11 MG/DL (ref 0–30)
WBC # BLD AUTO: 5.6 X10(3) UL (ref 4–11)

## 2021-09-13 PROCEDURE — 80061 LIPID PANEL: CPT

## 2021-09-13 PROCEDURE — 82306 VITAMIN D 25 HYDROXY: CPT

## 2021-09-13 PROCEDURE — 3078F DIAST BP <80 MM HG: CPT | Performed by: INTERNAL MEDICINE

## 2021-09-13 PROCEDURE — 36415 COLL VENOUS BLD VENIPUNCTURE: CPT

## 2021-09-13 PROCEDURE — 80053 COMPREHEN METABOLIC PANEL: CPT

## 2021-09-13 PROCEDURE — 84443 ASSAY THYROID STIM HORMONE: CPT

## 2021-09-13 PROCEDURE — 3008F BODY MASS INDEX DOCD: CPT | Performed by: INTERNAL MEDICINE

## 2021-09-13 PROCEDURE — 85025 COMPLETE CBC W/AUTO DIFF WBC: CPT

## 2021-09-13 PROCEDURE — 99395 PREV VISIT EST AGE 18-39: CPT | Performed by: INTERNAL MEDICINE

## 2021-09-13 PROCEDURE — 3074F SYST BP LT 130 MM HG: CPT | Performed by: INTERNAL MEDICINE

## 2021-09-13 PROCEDURE — 82607 VITAMIN B-12: CPT

## 2021-09-13 NOTE — PROGRESS NOTES
Oziel Hernandez is a 26year old female.   HPI:   Patient presents with:  Physical: Annual Px, Pain 3/10 to back since going back to work     Oswaldo presents for annual physical.    She explained that since teenage years in high school years she has been ab Voices no chest pain on exertion or shortness of breath  GI:   Voices no abdominal pain or changes of bowels   :Viices no urning or frequency of urination.   NEURO:  Voices no  headaches or dizziness    EXAM:   /68   Pulse 80   Temp 98 °F (36.7 °C)

## 2021-09-16 ENCOUNTER — TELEPHONE (OUTPATIENT)
Dept: INTERNAL MEDICINE CLINIC | Facility: CLINIC | Age: 31
End: 2021-09-16

## 2021-09-16 DIAGNOSIS — G47.9 SLEEP DISORDER: Primary | ICD-10-CM

## 2021-09-16 DIAGNOSIS — G47.10 EXCESSIVE SLEEPINESS: ICD-10-CM

## 2021-09-16 NOTE — TELEPHONE ENCOUNTER
Spoke to Alanis. Reviewed labs. Vitamin D just mildly low. She will let me know how much vitamin D supplement she takes.   Since labs did not show any culprit for her excessive sleepiness we did talk about seeing a pulmonologist was a sleep specialist.

## 2021-10-13 ENCOUNTER — OFFICE VISIT (OUTPATIENT)
Dept: OBGYN CLINIC | Facility: CLINIC | Age: 31
End: 2021-10-13
Payer: COMMERCIAL

## 2021-10-13 VITALS
SYSTOLIC BLOOD PRESSURE: 125 MMHG | WEIGHT: 146.38 LBS | BODY MASS INDEX: 25 KG/M2 | HEART RATE: 59 BPM | DIASTOLIC BLOOD PRESSURE: 77 MMHG

## 2021-10-13 DIAGNOSIS — Z01.419 WELL WOMAN EXAM: Primary | ICD-10-CM

## 2021-10-13 DIAGNOSIS — Z98.890 HISTORY OF LOOP ELECTROSURGICAL EXCISION PROCEDURE (LEEP): ICD-10-CM

## 2021-10-13 PROCEDURE — 99395 PREV VISIT EST AGE 18-39: CPT | Performed by: OBSTETRICS & GYNECOLOGY

## 2021-10-13 PROCEDURE — 3074F SYST BP LT 130 MM HG: CPT | Performed by: OBSTETRICS & GYNECOLOGY

## 2021-10-13 PROCEDURE — 3078F DIAST BP <80 MM HG: CPT | Performed by: OBSTETRICS & GYNECOLOGY

## 2021-10-13 NOTE — H&P
HPI:  The patient is a 27 yo F here for WWE. No menses with Mirena. Same sex relationship (new partner). Declines STD screen. Needs pap smear due to h/o LEEP last year. Occasional vaginal odor    LPS: 9/30/20 LSI? HPV+.   Colpo 9/14/20 with CHRIS 1 and Asked        Blood Transfusions: Not Asked        Caffeine Concern: No        Occupational Exposure: Not Asked        Hobby Hazards: Not Asked        Sleep Concern: Not Asked        Stress Concern: Not Asked        Weight Concern: Not Asked        Special Breast Cancer Other         familly h/o   • Diabetes Other         familly h/o   • Thyroid disease Other         familly h/o   • Hypertension Brother        MEDICATIONS:    Current Outpatient Medications:   •  Levonorgestrel 20 MCG/24HR Intrauterine IUD, 1 normal without masses or tenderness  Perineum: normal    Assessment/Plan:  Jerri Gutiérrez was seen today for gyn exam.    Diagnoses and all orders for this visit:    Well woman exam    History of loop electrosurgical excision procedure (LEEP)        1. WWE:   1.  R

## 2021-11-01 ENCOUNTER — OFFICE VISIT (OUTPATIENT)
Dept: OBGYN CLINIC | Facility: CLINIC | Age: 31
End: 2021-11-01
Payer: COMMERCIAL

## 2021-11-01 ENCOUNTER — TELEPHONE (OUTPATIENT)
Dept: OBGYN CLINIC | Facility: CLINIC | Age: 31
End: 2021-11-01

## 2021-11-01 VITALS
WEIGHT: 147 LBS | SYSTOLIC BLOOD PRESSURE: 130 MMHG | BODY MASS INDEX: 25 KG/M2 | DIASTOLIC BLOOD PRESSURE: 80 MMHG | HEART RATE: 53 BPM

## 2021-11-01 DIAGNOSIS — R87.810 CERVICAL HIGH RISK HUMAN PAPILLOMAVIRUS (HPV) DNA TEST POSITIVE: Primary | ICD-10-CM

## 2021-11-01 PROCEDURE — 3075F SYST BP GE 130 - 139MM HG: CPT | Performed by: OBSTETRICS & GYNECOLOGY

## 2021-11-01 PROCEDURE — 3079F DIAST BP 80-89 MM HG: CPT | Performed by: OBSTETRICS & GYNECOLOGY

## 2021-11-01 PROCEDURE — 57454 BX/CURETT OF CERVIX W/SCOPE: CPT | Performed by: OBSTETRICS & GYNECOLOGY

## 2021-11-01 NOTE — TELEPHONE ENCOUNTER
Pt has colpo scheduled at 11;20 with [address] wants to know if she can eat and should she take tylenol

## 2021-11-01 NOTE — TELEPHONE ENCOUNTER
Notified okay to eat and should take ibuprofen 600 mg 1 hr prior to appt, so can take now if she likes. Patient verbalized understanding. She does not have motrin, but will go pick some up right now.

## 2021-11-01 NOTE — PROCEDURES
Colpo with Endocervical Curettage    Birth control method(s) used: IUD    Consent signed. Procedure discussed with patient in detail including indication, risk, benefits, alternatives and complications.     Indication: History of LEEP last year; pap neg/

## 2021-12-08 ENCOUNTER — OFFICE VISIT (OUTPATIENT)
Dept: PULMONOLOGY | Facility: CLINIC | Age: 31
End: 2021-12-08
Payer: COMMERCIAL

## 2021-12-08 VITALS
HEART RATE: 70 BPM | BODY MASS INDEX: 25.93 KG/M2 | WEIGHT: 155.63 LBS | OXYGEN SATURATION: 100 % | RESPIRATION RATE: 16 BRPM | DIASTOLIC BLOOD PRESSURE: 71 MMHG | SYSTOLIC BLOOD PRESSURE: 115 MMHG | HEIGHT: 65 IN

## 2021-12-08 DIAGNOSIS — G47.33 OSA (OBSTRUCTIVE SLEEP APNEA): ICD-10-CM

## 2021-12-08 DIAGNOSIS — G47.10 HYPERSOMNIA: Primary | ICD-10-CM

## 2021-12-08 PROCEDURE — 3008F BODY MASS INDEX DOCD: CPT | Performed by: INTERNAL MEDICINE

## 2021-12-08 PROCEDURE — 3074F SYST BP LT 130 MM HG: CPT | Performed by: INTERNAL MEDICINE

## 2021-12-08 PROCEDURE — 99244 OFF/OP CNSLTJ NEW/EST MOD 40: CPT | Performed by: INTERNAL MEDICINE

## 2021-12-08 PROCEDURE — 3078F DIAST BP <80 MM HG: CPT | Performed by: INTERNAL MEDICINE

## 2021-12-08 NOTE — PROGRESS NOTES
[facility], [facility], [place]    Report of Consultation    Isatu Marcos Chasesara Patient Status:  Specimen    1990 MRN [id number]   Location [facility], [facility], [facility] Jaime Weir MD [facility]  General Surgery  Postprocedure Evaluation in Office  Pt Name: Love Meek  Date of Birth 1945   Today's Date: 6/21/2021  Medical Record Number: [id number]  Referring Provider: No ref. provider found  Primary Care Provider: Eduardo Macedo MD  Chief Complaint   Patient presents with   Nadia Jennifer Post-Op Check     s/p Repair recurrent left inguinal hernia with mesh 6/7/21     ASSESSMENT      Problem List Items Addressed This Visit     S/P left inguinal hernia repair - Primary           PLANS       Pathology reviewed with the patient who understands. All questions were answered. New Prescriptions    No medications on file     Patient Instructions   May return to full activity without restrictions. Follow up: Return if symptoms worsen or fail to improve. Orders Placed This Encounter:  No orders of the defined types were placed in this encounter. Garrett Griffin is seen today for post-op follow-up. He is 2 week(s) status post Left inguinal hernia repair. He is tolerating a regular diet, having regular bowel movements. Symptoms and activity have gradually improved compared to preoperative. The surgical site is clean and has no drainage. Pain is controlled without any medications. . He has compliant with postoperative instructions.   Past Medical History  Past Medical History:   Diagnosis Date    Arthritis     Diverticular hemorrhage     Dr. Dennise Mckeon History of blood transfusion     Hypertension      Past Surgical History  Past Surgical History:   Procedure Laterality Date    COLONOSCOPY      Dr. Noriega Serrossi one by Dr. Rodrigo Mendez in 2012   [address] N/A 6/7/2021    OPEN REPAIR OF RECURRENT LEF TINGUINAL HERNIA WITH MESH performed by Jaime Weir MD at [address]      Dr. Alicia Peguero     Medications  Current Outpatient Medications on File Prior to Visit   Medication Sig Dispense Refill    breast   • Hypertension Mother    • Stroke Paternal Grandfather         TIA's   • Breast Cancer Other         familly h/o   • Diabetes Other         familly h/o   • Thyroid disease Other         familly h/o   • Hypertension Brother        Social History  S Feeling of Stress :    Social Connections:     Frequency of Communication with Friends and Family:     Frequency of Social Gatherings with Friends and Family:     Attends Confucianism Services:     Active Member of Clubs or Organizations:     Attends Club or Organization Meetings:     Marital Status:    Intimate Partner Violence:     Fear of Current or Ex-Partner:     Emotionally Abused:     Physically Abused:     Sexually Abused:      Health Screening Exams  Health Maintenance   Topic Date Due    Hepatitis C screen  Never done    DTaP/Tdap/Td vaccine (1 - Tdap) 12/15/1962    Shingles Vaccine (1 of 2) Never done    Low dose CT lung screening  Never done    Pneumococcal 65+ years Vaccine (1 of 1 - PPSV23) Never done   [employer] Visit (AWV)  Never done    Flu vaccine (Season Ended) 09/01/2021    Creatinine monitoring  05/28/2022    Potassium monitoring  06/07/2022    COVID-19 Vaccine  Completed    Hepatitis A vaccine  Aged Out    Hepatitis B vaccine  Aged Out    Hib vaccine  Aged Out    Meningococcal (ACWY) vaccine  Aged Out     Review of Systems  History obtained from the patient. Constitutional: Denies any fever, chills, fatigue. Wound: Denies any rash, skin color changes or wound problems. Resp: Denies any cough, shortness of breath. CV: Denies any chest pain, orthopnea or syncope. GI: Positive for incisional discomfort only. Denies any nausea, vomiting, blood in the stool, constipation or diarrhea. : Denies any hematuria, hesitancy or dysuria. OBJECTIVE    VITALS:  height is 5' 7\" (1.702 m) and weight is 164 lb (74.4 kg). His temporal temperature is 97.1 °F (36.2 °C). His blood pressure is 122/64 and his pulse is 67. His respiration is 18 and oxygen saturation is 95%. CONSTITUTIONAL: Alert and oriented times 3, no acute distress and cooperative to examination. SKIN: Skin color, texture, turgor normal. No rashes or lesions.   INCISION: wound margins intact and healing Upper airway class II / no enlarged tonsils   No underline significant medical issue   No symptoms for Narcolepsy   Normal exam     Educated about MARLIN   Good sleep hygiene with regular sleep-wake cycles  Avoid TV in bedroom    Home sleep study  Continue well.  No signs of infection. No drainage. ABDOMEN: soft, nontender, nondistended, no masses or organomegaly. Bowel sounds normal. Inguinal curvilinear bilateral left groin scar present with prominent healing ridge. No sign of recurrence, hematoma or seroma. Tenderness to palpation incisional only.    NEUROLOGIC: No sensory or motor nerve irritation  MALE : bilateral testes normal, no scrotal swelling or edema               Electronically signed by Jessenia Wood MD MD FACS on 6/21/2021 at 1:10 PM

## 2022-01-18 ENCOUNTER — OFFICE VISIT (OUTPATIENT)
Dept: SLEEP CENTER | Age: 32
End: 2022-01-18
Attending: INTERNAL MEDICINE
Payer: COMMERCIAL

## 2022-01-18 DIAGNOSIS — G47.10 HYPERSOMNIA: ICD-10-CM

## 2022-01-18 DIAGNOSIS — G47.33 OSA (OBSTRUCTIVE SLEEP APNEA): ICD-10-CM

## 2022-01-18 PROCEDURE — 95806 SLEEP STUDY UNATT&RESP EFFT: CPT

## 2022-01-24 ENCOUNTER — TELEPHONE (OUTPATIENT)
Dept: INTERNAL MEDICINE CLINIC | Facility: CLINIC | Age: 32
End: 2022-01-24

## 2022-01-29 ENCOUNTER — TELEPHONE (OUTPATIENT)
Dept: INTERNAL MEDICINE CLINIC | Facility: CLINIC | Age: 32
End: 2022-01-29

## 2022-02-21 NOTE — TELEPHONE ENCOUNTER
1/26 Received message from answering service    Pt is requesting call back with Covid instructions & also needs a note for work     [phone]
As FYI to DR. OSHEA
I tried to leave message but her mailbox is full. I will send her a note through my chart regarding the above.
LMTCB with patient.
Left message to call back on cell #. No other alternate number listed. Per record review: patient viewed letter on 1/29/22.
Left message to call back to get more information. To Dr. Brooke Staton, awaiting call back from patient.
Left message to call back.
Left message to call back. When did she test positive?   To DORIE BURCH
Noted.
Noted.  Patient also sent a Streamline message to confirm receipt    To Dr. García Arms
Patient is calling back. Was asked if she received the letter sent in [address], patient verified that she did receive the letter. Patient also states she will respond to Dr Maura Costa via [address] with any further questions she may have.
Patient is calling to request a letter from Dr Thanh Ruth regarding why she missed work. Patient states her girlfriend (significant other) tested positivefor covid. Patient states she was having covid like symptoms and had gotten tested for covid on 1/20/2022.
Patient is calling today she tested positive with an at home test & PCR results    Patient needs a note for her work stating last week she was exposed to her girlfriend who is positive also the note needs to state she is not covid positive and will need to
Please call patient and ask her if she received my letter sent through [address] that she requested. In addition please confirm she received my message for her booster to not be J&J and her booster being either with [facility] Cheney on [address].   If it is with [facility]
Please let patient know according to the CDC the following guidelines for quarantine after exposure to somebody with Covid is as follows:    1. Looks like she got the J&J vaccine in March.   If that is the only vaccine that she got an is not boosted she sh
yes

## 2022-02-22 ENCOUNTER — OFFICE VISIT (OUTPATIENT)
Dept: SLEEP CENTER | Age: 32
End: 2022-02-22
Attending: INTERNAL MEDICINE
Payer: COMMERCIAL

## 2022-02-22 DIAGNOSIS — G47.33 OSA (OBSTRUCTIVE SLEEP APNEA): ICD-10-CM

## 2022-02-22 DIAGNOSIS — G47.10 HYPERSOMNIA: ICD-10-CM

## 2022-02-22 PROCEDURE — 95806 SLEEP STUDY UNATT&RESP EFFT: CPT

## 2022-02-25 NOTE — PROCEDURES
[address] [place] [address]  Accredited by the [facility] ([facility])    PATIENT'S NAME: Marija Santos   ATTENDING PHYSICIAN: Amador Jones. Maryjo Quezada MD   REFERRING PHYSICIAN: Amador Jones. Maryjo Quezada MD   PATIENT ACCOUNT #: [de-identified] LOCATION: [address] [place] [address] RECORD #: [id number] YOB: 1990   DATE OF STUDY: 02/22/2022       SLEEP STUDY REPORT    STUDY TYPE:  Home sleep test.    INDICATION:  Suspected obstructive sleep apnea (ICD-10 code G47.33), in a patient with snoring, hypersomnia, an [place] Sleepiness Scale score of 10/24, with chronic fatigue, witnessed apneic events, unrefreshing sleep, and a body mass index of 26.9. RESULTS:  The patient underwent home sleep test with measurement of her nasal airflow, nasal air pressure, snoring, chest and abdominal wall motion, oximetry, and body position. I have reviewed the entirety of the raw data of this study. During this study, the total recording time was 440 minutes. The lights-out clock time is 10:33 p.m.; the lights-on clock time is 5:53 a.m. There were 9 apneic events, of which all were obstructive, for an apnea index of 1.2 events per hour. There were 20 hypopneic events for a hypopnea index of 2.7 events per hour. The combined apnea plus hypopnea index is 3.9 events per hour. There was no significant hypoventilation, Cheyne-Alves breathing, or periodic breathing. The average oxygen saturation is 98%, the lowest oxygen saturation is 94%, and the average desaturation is 3%. The oxygen desaturation index is 2.9 events per hour. The patient spent 196 minutes in the supine position, equivalent to 45% of the testing, and 243 minutes in the non-supine position, equivalent to 55% of the testing. The average heart rate is 55 beats per minute. INTERPRETATION:  The data generated from this study shows no evidence of significant sleep-disordered breathing. Snoring is appreciated. RECOMMENDATIONS:    1.    Clinical correlation is required regarding this patient's excessive daytime somnolence. 2.   Avoid alcohol and sedating drug. 3.   Patient should not drive if at all sleepy. 4.   Would consider extending sleep time by 1 to 2 hours to assess for impact on daytime somnolence. 5.   Screen for hypothyroidism if not already performed. 6.   If narcolepsy is a diagnostic consideration, would proceed to multiple sleep latency testing. Please do not hesitate to contact me if there is any question whatsoever regarding interpretation of this study. Dictated By Sandra Interiano MD  d: 02/24/2022 17:36:08  t: 02/24/2022 18:01:00  [facility] [facility] [id number]  BK[facility]/    cc: Jhon Wade.  MD Shelia Conrad MD

## 2022-03-03 ENCOUNTER — TELEPHONE (OUTPATIENT)
Dept: PULMONOLOGY | Facility: CLINIC | Age: 32
End: 2022-03-03

## 2022-03-03 NOTE — TELEPHONE ENCOUNTER
Pt asking if she still needs to keep appt on 3/9 - since she has gone over sleep study results with RN

## 2022-03-04 NOTE — TELEPHONE ENCOUNTER
Patient still suffering from significant daytime sleepiness and sleep attack to follow-up with me otherwise no need to follow-up

## 2022-03-09 ENCOUNTER — OFFICE VISIT (OUTPATIENT)
Dept: PULMONOLOGY | Facility: CLINIC | Age: 32
End: 2022-03-09
Payer: COMMERCIAL

## 2022-03-09 VITALS
HEART RATE: 65 BPM | HEIGHT: 65 IN | BODY MASS INDEX: 25.83 KG/M2 | WEIGHT: 155 LBS | SYSTOLIC BLOOD PRESSURE: 123 MMHG | RESPIRATION RATE: 14 BRPM | OXYGEN SATURATION: 100 % | DIASTOLIC BLOOD PRESSURE: 83 MMHG

## 2022-03-09 DIAGNOSIS — G47.10 HYPERSOMNIA: Primary | ICD-10-CM

## 2022-03-09 PROCEDURE — 3074F SYST BP LT 130 MM HG: CPT | Performed by: INTERNAL MEDICINE

## 2022-03-09 PROCEDURE — 3079F DIAST BP 80-89 MM HG: CPT | Performed by: INTERNAL MEDICINE

## 2022-03-09 PROCEDURE — 99213 OFFICE O/P EST LOW 20 MIN: CPT | Performed by: INTERNAL MEDICINE

## 2022-03-09 PROCEDURE — 3008F BODY MASS INDEX DOCD: CPT | Performed by: INTERNAL MEDICINE

## 2022-03-21 ENCOUNTER — OFFICE VISIT (OUTPATIENT)
Dept: ALLERGY | Facility: CLINIC | Age: 32
End: 2022-03-21
Payer: COMMERCIAL

## 2022-03-21 ENCOUNTER — NURSE ONLY (OUTPATIENT)
Dept: ALLERGY | Facility: CLINIC | Age: 32
End: 2022-03-21
Payer: COMMERCIAL

## 2022-03-21 VITALS
RESPIRATION RATE: 20 BRPM | DIASTOLIC BLOOD PRESSURE: 88 MMHG | BODY MASS INDEX: 26.66 KG/M2 | SYSTOLIC BLOOD PRESSURE: 142 MMHG | WEIGHT: 160 LBS | HEART RATE: 78 BPM | HEIGHT: 65 IN | OXYGEN SATURATION: 100 %

## 2022-03-21 DIAGNOSIS — T78.1XXA ADVERSE FOOD REACTION, INITIAL ENCOUNTER: ICD-10-CM

## 2022-03-21 DIAGNOSIS — K59.09 OTHER CONSTIPATION: ICD-10-CM

## 2022-03-21 DIAGNOSIS — Z91.018 MULTIPLE FOOD ALLERGIES: ICD-10-CM

## 2022-03-21 DIAGNOSIS — Z91.018 FOOD ALLERGY: Primary | ICD-10-CM

## 2022-03-21 PROCEDURE — 95004 PERQ TESTS W/ALRGNC XTRCS: CPT | Performed by: ALLERGY & IMMUNOLOGY

## 2022-03-21 PROCEDURE — 3008F BODY MASS INDEX DOCD: CPT | Performed by: ALLERGY & IMMUNOLOGY

## 2022-03-21 PROCEDURE — 3077F SYST BP >= 140 MM HG: CPT | Performed by: ALLERGY & IMMUNOLOGY

## 2022-03-21 PROCEDURE — 3079F DIAST BP 80-89 MM HG: CPT | Performed by: ALLERGY & IMMUNOLOGY

## 2022-03-21 PROCEDURE — 99204 OFFICE O/P NEW MOD 45 MIN: CPT | Performed by: ALLERGY & IMMUNOLOGY

## 2022-03-21 NOTE — PATIENT INSTRUCTIONS
Recs:  Handouts on food allergies versus food intolerances provided and reviewed  See above skin testing to common food allergens to screen for an IgE mediated process.   Check celiac panel  Reviewed with patient that lactose intolerance testing no longer offered as a blood test.  May consider fair life for Lactaid as lactose-free milk for alternative such as almond milk cashew milk  Handouts on high histamine foods provided and reviewed  May consider a probiotic for gut health as well  Encourage fruits and vegetables for fiber to help prevent constipation  May consider stool softener such as MiraLAX if refractory

## 2022-05-17 ENCOUNTER — WALK IN (OUTPATIENT)
Dept: URGENT CARE | Age: 32
End: 2022-05-17

## 2022-05-17 VITALS
SYSTOLIC BLOOD PRESSURE: 100 MMHG | HEART RATE: 71 BPM | TEMPERATURE: 98.4 F | OXYGEN SATURATION: 98 % | DIASTOLIC BLOOD PRESSURE: 64 MMHG

## 2022-05-17 DIAGNOSIS — J02.9 SORE THROAT: Primary | ICD-10-CM

## 2022-05-17 LAB
INTERNAL PROCEDURAL CONTROLS ACCEPTABLE: YES
INTERNAL PROCEDURAL CONTROLS ACCEPTABLE: YES
S PYO AG THROAT QL IA.RAPID: NEGATIVE
SARS-COV+SARS-COV-2 AG RESP QL IA.RAPID: NOT DETECTED

## 2022-05-17 PROCEDURE — 87880 STREP A ASSAY W/OPTIC: CPT | Performed by: NURSE PRACTITIONER

## 2022-05-17 PROCEDURE — 87426 SARSCOV CORONAVIRUS AG IA: CPT | Performed by: NURSE PRACTITIONER

## 2022-05-17 PROCEDURE — 99213 OFFICE O/P EST LOW 20 MIN: CPT | Performed by: NURSE PRACTITIONER

## 2022-06-24 ENCOUNTER — WALK IN (OUTPATIENT)
Dept: URGENT CARE | Age: 32
End: 2022-06-24

## 2022-06-24 VITALS
HEART RATE: 86 BPM | DIASTOLIC BLOOD PRESSURE: 78 MMHG | TEMPERATURE: 99 F | SYSTOLIC BLOOD PRESSURE: 120 MMHG | OXYGEN SATURATION: 98 %

## 2022-06-24 DIAGNOSIS — U07.1 COVID: ICD-10-CM

## 2022-06-24 DIAGNOSIS — J10.1 INFLUENZA A: ICD-10-CM

## 2022-06-24 DIAGNOSIS — J22 LRTI (LOWER RESPIRATORY TRACT INFECTION): Primary | ICD-10-CM

## 2022-06-24 LAB
FLUAV AG UPPER RESP QL IA.RAPID: POSITIVE
FLUBV AG UPPER RESP QL IA.RAPID: NEGATIVE
INTERNAL PROCEDURAL CONTROLS ACCEPTABLE: YES
INTERNAL PROCEDURAL CONTROLS ACCEPTABLE: YES
SARS-COV+SARS-COV-2 AG RESP QL IA.RAPID: DETECTED

## 2022-06-24 PROCEDURE — 87426 SARSCOV CORONAVIRUS AG IA: CPT | Performed by: PHYSICIAN ASSISTANT

## 2022-06-24 PROCEDURE — 99213 OFFICE O/P EST LOW 20 MIN: CPT | Performed by: PHYSICIAN ASSISTANT

## 2022-06-24 PROCEDURE — 87804 INFLUENZA ASSAY W/OPTIC: CPT | Performed by: PHYSICIAN ASSISTANT

## 2022-06-24 RX ORDER — AZITHROMYCIN 250 MG/1
TABLET, FILM COATED ORAL
Qty: 6 TABLET | Refills: 0 | Status: SHIPPED | OUTPATIENT
Start: 2022-06-24 | End: 2022-06-29

## 2022-06-24 RX ORDER — OSELTAMIVIR PHOSPHATE 75 MG/1
75 CAPSULE ORAL 2 TIMES DAILY
Qty: 10 CAPSULE | Refills: 0 | Status: SHIPPED | OUTPATIENT
Start: 2022-06-24 | End: 2022-06-29

## 2022-06-24 RX ORDER — ALBUTEROL SULFATE 90 UG/1
1 AEROSOL, METERED RESPIRATORY (INHALATION) EVERY 4 HOURS PRN
Qty: 1 EACH | Refills: 0 | Status: SHIPPED | OUTPATIENT
Start: 2022-06-24

## 2022-07-20 ENCOUNTER — TELEPHONE (OUTPATIENT)
Dept: ALLERGY | Facility: CLINIC | Age: 32
End: 2022-07-20

## 2022-07-20 NOTE — TELEPHONE ENCOUNTER
Labs from 3/21/2022 have not been completed. Letter sent home. Postponed x 2 months. Dr. Suleman Lisa, if labs have not been completed in that time okay to cancel?

## 2022-08-03 ENCOUNTER — PATIENT MESSAGE (OUTPATIENT)
Dept: OBGYN CLINIC | Facility: CLINIC | Age: 32
End: 2022-08-03

## 2022-08-03 RX ORDER — VALACYCLOVIR HYDROCHLORIDE 500 MG/1
500 TABLET, FILM COATED ORAL 2 TIMES DAILY
Qty: 6 TABLET | Refills: 0 | Status: SHIPPED | OUTPATIENT
Start: 2022-08-03 | End: 2022-08-06

## 2022-08-03 NOTE — TELEPHONE ENCOUNTER
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 She is requesting additional refills to have on file. This is her first outbreak in a year. To JAMES on call.

## 2022-08-03 NOTE — TELEPHONE ENCOUNTER
From: Marva Saenz  To: Judy Yen DO  Sent: 8/3/2022 8:41 AM CDT  Subject: Prescription    Hi Dr. Abby Hunter, I am currently having a mild herpes outbreak, wondering if you can prescribe medication to help and keep on hand. Thank you in advance.

## 2022-08-03 NOTE — TELEPHONE ENCOUNTER
I will only give one dose valtrex 500 mg po bid x 3 days.  Needs to d/w primary gyne additional refills

## 2022-08-05 ENCOUNTER — PATIENT MESSAGE (OUTPATIENT)
Dept: OBGYN CLINIC | Facility: CLINIC | Age: 32
End: 2022-08-05

## 2022-08-06 RX ORDER — VALACYCLOVIR HYDROCHLORIDE 500 MG/1
500 TABLET, FILM COATED ORAL 2 TIMES DAILY
Qty: 6 TABLET | Refills: 0 | Status: SHIPPED | OUTPATIENT
Start: 2022-08-06 | End: 2022-08-09

## 2022-09-20 ENCOUNTER — OFFICE VISIT (OUTPATIENT)
Dept: DERMATOLOGY CLINIC | Facility: CLINIC | Age: 32
End: 2022-09-20

## 2022-09-20 DIAGNOSIS — D22.9 MULTIPLE BENIGN NEVI: ICD-10-CM

## 2022-09-20 DIAGNOSIS — L81.4 LENTIGINES: Primary | ICD-10-CM

## 2022-09-20 PROCEDURE — 99204 OFFICE O/P NEW MOD 45 MIN: CPT | Performed by: STUDENT IN AN ORGANIZED HEALTH CARE EDUCATION/TRAINING PROGRAM

## 2022-09-20 RX ORDER — CLINDAMYCIN PHOSPHATE 10 UG/ML
LOTION TOPICAL
Qty: 60 ML | Refills: 11 | Status: SHIPPED | OUTPATIENT
Start: 2022-09-20

## 2022-09-20 NOTE — ADDENDUM NOTE
Addended by: Milton Monterroso on: 9/20/2022 12:41 PM     Modules accepted: Orders, Level of Service

## 2022-09-23 ENCOUNTER — OFFICE VISIT (OUTPATIENT)
Dept: OBGYN CLINIC | Facility: CLINIC | Age: 32
End: 2022-09-23

## 2022-09-23 VITALS
WEIGHT: 165.63 LBS | DIASTOLIC BLOOD PRESSURE: 78 MMHG | SYSTOLIC BLOOD PRESSURE: 113 MMHG | BODY MASS INDEX: 28 KG/M2 | HEART RATE: 80 BPM

## 2022-09-23 DIAGNOSIS — Z12.4 SCREENING FOR MALIGNANT NEOPLASM OF CERVIX: Primary | ICD-10-CM

## 2022-09-23 PROCEDURE — 3074F SYST BP LT 130 MM HG: CPT | Performed by: OBSTETRICS & GYNECOLOGY

## 2022-09-23 PROCEDURE — 99395 PREV VISIT EST AGE 18-39: CPT | Performed by: OBSTETRICS & GYNECOLOGY

## 2022-09-23 PROCEDURE — 3078F DIAST BP <80 MM HG: CPT | Performed by: OBSTETRICS & GYNECOLOGY

## 2022-09-26 LAB — HPV I/H RISK 1 DNA SPEC QL NAA+PROBE: POSITIVE

## 2022-10-06 LAB
HPV16 DNA CVX QL PROBE+SIG AMP: NEGATIVE
HPV18 DNA CVX QL PROBE+SIG AMP: NEGATIVE

## 2022-10-24 ENCOUNTER — TELEPHONE (OUTPATIENT)
Dept: OBGYN CLINIC | Facility: CLINIC | Age: 32
End: 2022-10-24

## 2022-10-24 NOTE — TELEPHONE ENCOUNTER
Pt would like to schedule colpo. Asking for November 10 or November 11, [facility] or [facility].     Please Hermila Altamirano

## 2022-10-24 NOTE — TELEPHONE ENCOUNTER
Pt called and offered appt on 11/10 with [facility] for Colpo, pt accepts 2 pm slot. Pt has Mirena IUD and is aware to take Ibuprofen 600 mg 1 hr before.

## 2022-11-10 ENCOUNTER — OFFICE VISIT (OUTPATIENT)
Dept: OBGYN CLINIC | Facility: CLINIC | Age: 32
End: 2022-11-10
Payer: COMMERCIAL

## 2022-11-10 VITALS
WEIGHT: 164.81 LBS | HEART RATE: 71 BPM | BODY MASS INDEX: 27 KG/M2 | SYSTOLIC BLOOD PRESSURE: 119 MMHG | DIASTOLIC BLOOD PRESSURE: 77 MMHG

## 2022-11-10 DIAGNOSIS — R87.810 CERVICAL HIGH RISK HUMAN PAPILLOMAVIRUS (HPV) DNA TEST POSITIVE: Primary | ICD-10-CM

## 2022-11-10 PROCEDURE — 3074F SYST BP LT 130 MM HG: CPT | Performed by: OBSTETRICS & GYNECOLOGY

## 2022-11-10 PROCEDURE — 57456 ENDOCERV CURETTAGE W/SCOPE: CPT | Performed by: OBSTETRICS & GYNECOLOGY

## 2022-11-10 PROCEDURE — 3078F DIAST BP <80 MM HG: CPT | Performed by: OBSTETRICS & GYNECOLOGY

## 2022-11-10 RX ORDER — OSELTAMIVIR PHOSPHATE 75 MG/1
CAPSULE ORAL
COMMUNITY
Start: 2022-06-24

## 2022-11-10 RX ORDER — VALACYCLOVIR HYDROCHLORIDE 500 MG/1
TABLET, FILM COATED ORAL
Qty: 90 TABLET | Refills: 0 | Status: SHIPPED | OUTPATIENT
Start: 2022-11-10

## 2022-11-10 RX ORDER — ALBUTEROL SULFATE 90 UG/1
AEROSOL, METERED RESPIRATORY (INHALATION)
COMMUNITY
Start: 2022-08-10

## 2022-11-10 RX ORDER — BENZONATATE 100 MG/1
CAPSULE ORAL
COMMUNITY
Start: 2022-10-26

## 2022-11-10 RX ORDER — COVID-19 MOLECULAR TEST ASSAY
KIT MISCELLANEOUS
COMMUNITY
Start: 2022-08-09

## 2022-11-10 NOTE — PROCEDURES
Colpo with Endocervical Curettage    Consent signed. Procedure discussed with patient in detail including indication, risk, benefits, alternatives and complications. Indication: pap neg/+HRHPV with history of abnormal paps    Procedure: The patient was placed in the dorsal lithotomy position. [place] speculum was placed in the vagina. Cervix prepped with: Acetic acid  Lugol  Under colposcopic examination the transition zone was retracted. Endocervix was curetted using a Kervorkian curette. Specimen sent to pathology. Patient tolerated procedure well. Discharge instructions of pelvic rest & no swimming x one week.

## 2023-02-01 ENCOUNTER — OFFICE VISIT (OUTPATIENT)
Dept: SURGERY | Facility: CLINIC | Age: 33
End: 2023-02-01
Payer: COMMERCIAL

## 2023-02-01 DIAGNOSIS — S01.312A TEAR OF LEFT EARLOBE, INITIAL ENCOUNTER: Primary | ICD-10-CM

## 2023-02-01 RX ORDER — UBIDECARENONE 75 MG
250 CAPSULE ORAL DAILY
COMMUNITY

## 2023-02-01 RX ORDER — RIBOFLAVIN (VITAMIN B2) 100 MG
100 TABLET ORAL DAILY
COMMUNITY

## 2023-02-07 ENCOUNTER — NURSE ONLY (OUTPATIENT)
Dept: SURGERY | Facility: CLINIC | Age: 33
End: 2023-02-07
Payer: COMMERCIAL

## 2023-02-07 PROCEDURE — 99024 POSTOP FOLLOW-UP VISIT: CPT | Performed by: SURGERY

## 2023-02-07 NOTE — PROGRESS NOTES
Patient presents today for suture removal s/p in office left ear lobe repair by Dr. Gaby Wilson on 02/01/2023. Incision site is clean, dry and intact. Prolene sutures in place. No erythema, wound drainage or dehiscence. No evidence of necrosis. All prolene sutures removed today. Patient tolerated this well. New steri strips placed. Patient will follow up with Dr. Gaby Wilson in 2-3 weeks. Patient will call the office to schedule this appointment in coordination with her schedule. All questions asked and answered.

## 2023-03-21 ENCOUNTER — PATIENT MESSAGE (OUTPATIENT)
Dept: OBGYN CLINIC | Facility: CLINIC | Age: 33
End: 2023-03-21

## 2023-03-21 ENCOUNTER — OFFICE VISIT (OUTPATIENT)
Dept: OBGYN CLINIC | Facility: CLINIC | Age: 33
End: 2023-03-21

## 2023-03-21 ENCOUNTER — PATIENT MESSAGE (OUTPATIENT)
Dept: DERMATOLOGY CLINIC | Facility: CLINIC | Age: 33
End: 2023-03-21

## 2023-03-21 VITALS
SYSTOLIC BLOOD PRESSURE: 118 MMHG | HEART RATE: 69 BPM | BODY MASS INDEX: 28 KG/M2 | DIASTOLIC BLOOD PRESSURE: 76 MMHG | WEIGHT: 169 LBS

## 2023-03-21 DIAGNOSIS — N89.8 VAGINAL ODOR: Primary | ICD-10-CM

## 2023-03-21 PROCEDURE — 3078F DIAST BP <80 MM HG: CPT | Performed by: OBSTETRICS & GYNECOLOGY

## 2023-03-21 PROCEDURE — 99213 OFFICE O/P EST LOW 20 MIN: CPT | Performed by: OBSTETRICS & GYNECOLOGY

## 2023-03-21 PROCEDURE — 3074F SYST BP LT 130 MM HG: CPT | Performed by: OBSTETRICS & GYNECOLOGY

## 2023-03-22 ENCOUNTER — TELEPHONE (OUTPATIENT)
Dept: OBGYN CLINIC | Facility: CLINIC | Age: 33
End: 2023-03-22

## 2023-03-22 NOTE — TELEPHONE ENCOUNTER
----- Message from Lesa Damian DO sent at 3/22/2023  8:49 AM CDT -----  +MARIA ESTHER.   Okay for flagyl or metrogel

## 2023-03-22 NOTE — TELEPHONE ENCOUNTER
Pt notified of results and recs. Pt states derm just sent in an rx for an antibiotic for acne today. She wants to make sure these can be taken together. She does not know name of medication. I asked pt to send name of antibiotic via [employer] once she picks up rx. Patient verbalized understanding.

## 2023-03-23 LAB
GENITAL VAGINOSIS SCREEN: POSITIVE
TRICHOMONAS SCREEN: NEGATIVE

## 2023-03-23 RX ORDER — METRONIDAZOLE 500 MG/1
500 TABLET ORAL 2 TIMES DAILY
Qty: 14 TABLET | Refills: 0 | Status: SHIPPED | OUTPATIENT
Start: 2023-03-23 | End: 2023-03-30

## 2023-03-24 ENCOUNTER — OFFICE VISIT (OUTPATIENT)
Dept: INTERNAL MEDICINE CLINIC | Facility: CLINIC | Age: 33
End: 2023-03-24

## 2023-03-24 VITALS
BODY MASS INDEX: 28.39 KG/M2 | TEMPERATURE: 99 F | HEART RATE: 73 BPM | WEIGHT: 170.38 LBS | DIASTOLIC BLOOD PRESSURE: 74 MMHG | OXYGEN SATURATION: 98 % | HEIGHT: 65 IN | SYSTOLIC BLOOD PRESSURE: 118 MMHG

## 2023-03-24 DIAGNOSIS — R51.9 FREQUENT HEADACHES: ICD-10-CM

## 2023-03-24 DIAGNOSIS — M62.9 MUSCULOSKELETAL DISORDER INVOLVING UPPER TRAPEZIUS MUSCLE: ICD-10-CM

## 2023-03-24 DIAGNOSIS — Z00.00 ANNUAL PHYSICAL EXAM: Primary | ICD-10-CM

## 2023-03-24 PROCEDURE — 99395 PREV VISIT EST AGE 18-39: CPT | Performed by: INTERNAL MEDICINE

## 2023-03-24 PROCEDURE — 3074F SYST BP LT 130 MM HG: CPT | Performed by: INTERNAL MEDICINE

## 2023-03-24 PROCEDURE — 3008F BODY MASS INDEX DOCD: CPT | Performed by: INTERNAL MEDICINE

## 2023-03-24 PROCEDURE — 3078F DIAST BP <80 MM HG: CPT | Performed by: INTERNAL MEDICINE

## 2023-03-27 RX ORDER — DOXYCYCLINE HYCLATE 100 MG/1
100 CAPSULE ORAL 2 TIMES DAILY
Qty: 60 CAPSULE | Refills: 0 | Status: SHIPPED | OUTPATIENT
Start: 2023-03-27

## 2023-03-27 NOTE — TELEPHONE ENCOUNTER
From: Berenice Arauz MD  To: Magnolia Saenz  Sent: 3/21/2023 5:01 PM CDT  Subject: Acne    Ms. Saenz,    I hear your acne is flaring. [place] you leaving the country? The reason I ask is that I can prescribe a short course of antibiotics; however, they would make you more sensitive to the sun.      Berenice Arauz MD

## 2023-05-17 ENCOUNTER — TELEPHONE (OUTPATIENT)
Dept: OBGYN CLINIC | Facility: CLINIC | Age: 33
End: 2023-05-17

## 2023-05-18 ENCOUNTER — TELEPHONE (OUTPATIENT)
Dept: OBGYN CLINIC | Facility: CLINIC | Age: 33
End: 2023-05-18

## 2023-05-18 NOTE — TELEPHONE ENCOUNTER
Patient dropped off a form for [facility] to fill out. Patient states [address] printed out patients records already the last time she saw him but the other facility needs this form filled out. Form placed in  room. Patient states she needs ASAP and would like to  on Saturday if possible. Please contact when completed.

## 2023-05-18 NOTE — TELEPHONE ENCOUNTER
Noted. Medical Clearance form from [facility] placed on [address] desk for review and signature.

## 2023-09-01 ENCOUNTER — OFFICE VISIT (OUTPATIENT)
Dept: PHYSICAL MEDICINE AND REHAB | Facility: CLINIC | Age: 33
End: 2023-09-01
Payer: COMMERCIAL

## 2023-09-01 ENCOUNTER — TELEPHONE (OUTPATIENT)
Dept: PHYSICAL MEDICINE AND REHAB | Facility: CLINIC | Age: 33
End: 2023-09-01

## 2023-09-01 VITALS — HEART RATE: 77 BPM | OXYGEN SATURATION: 98 % | WEIGHT: 170 LBS | BODY MASS INDEX: 28 KG/M2

## 2023-09-01 DIAGNOSIS — M54.2 CERVICALGIA: Primary | ICD-10-CM

## 2023-09-01 DIAGNOSIS — M79.18 MYOFASCIAL PAIN: ICD-10-CM

## 2023-09-01 DIAGNOSIS — G47.00 INSOMNIA, UNSPECIFIED TYPE: ICD-10-CM

## 2023-09-01 PROCEDURE — 99244 OFF/OP CNSLTJ NEW/EST MOD 40: CPT | Performed by: PHYSICAL MEDICINE & REHABILITATION

## 2023-09-01 PROCEDURE — 20552 NJX 1/MLT TRIGGER POINT 1/2: CPT | Performed by: PHYSICAL MEDICINE & REHABILITATION

## 2023-09-01 RX ORDER — LIDOCAINE HYDROCHLORIDE 10 MG/ML
4 INJECTION, SOLUTION INFILTRATION; PERINEURAL ONCE
Status: COMPLETED | OUTPATIENT
Start: 2023-09-01 | End: 2023-09-01

## 2023-09-01 RX ORDER — TRIAMCINOLONE ACETONIDE 40 MG/ML
40 INJECTION, SUSPENSION INTRA-ARTICULAR; INTRAMUSCULAR ONCE
Status: COMPLETED | OUTPATIENT
Start: 2023-09-01 | End: 2023-09-01

## 2023-09-01 NOTE — PATIENT INSTRUCTIONS
Look up \"scapular stabilization exercises\" on YouTube and do them when you can. Do 30 minutes of aerobic exercise either walking or jogging 5 days a week, choose the days. Do physical therapy when you can. Return to see me in 6 weeks or so  Ice over the area of injections if possible for today.

## 2023-09-05 PROBLEM — M54.2 CERVICALGIA: Status: ACTIVE | Noted: 2023-09-05

## 2023-09-05 PROBLEM — M79.18 MYOFASCIAL PAIN: Status: ACTIVE | Noted: 2023-09-05

## 2023-09-05 PROBLEM — G47.00 INSOMNIA: Status: ACTIVE | Noted: 2023-09-05

## 2023-09-06 ENCOUNTER — TELEPHONE (OUTPATIENT)
Dept: PHYSICAL MEDICINE AND REHAB | Facility: CLINIC | Age: 33
End: 2023-09-06

## 2023-09-06 NOTE — TELEPHONE ENCOUNTER
Initiated retro authorization for Trigger point injection bilateral trapezius CPT/HCPCS 39141, [id number] with [employer]  Status: Approved-authorization is not required per health plan      Inj done in office

## 2023-09-08 NOTE — PROCEDURES
Trigger point injection  Location:  bilateral trapezius  After discussing benefits and possible side effects, we proceeded with a trigger point injection. The patient was consented. The patient was seated, and the muscle was palpated. The skin was sterilely prepped. Using aseptic technique and a fanning motion, a total of 0.5 cc of 40 mg/ml Kenalog and 2 cc of 1% lidocaine was injected into each trigger point. The patient tolerated the procedure well without adverse effects.

## 2023-10-03 ENCOUNTER — TELEPHONE (OUTPATIENT)
Dept: OBGYN CLINIC | Facility: CLINIC | Age: 33
End: 2023-10-03

## 2023-10-03 NOTE — TELEPHONE ENCOUNTER
Pt calling unsure if her IUD is still in place. Pt has Mirena IUD since 2020 and no cycles since placement. Pt states she started to not cramping for the last several days then started bleeding this morning like a cycle. Pt states changing tampons every 2-3 hrs, not soaking them. Passing clots the size of finger nail and having abdominal cramping 6-7/10. Pt has taken one dose of ES tylenol at this time. Pt informed to consider Ibuprofen 600 mg every 6hr w/food. Would like to be seen. States she use to be able to feel her strings, no longer does. Pt offered several appts, accepts with [facility] on Friday. Pt works for [employer], out of state at this time. Pt given bleeding and pain precautions.

## 2023-10-06 ENCOUNTER — TELEPHONE (OUTPATIENT)
Dept: OBGYN CLINIC | Facility: CLINIC | Age: 33
End: 2023-10-06

## 2023-10-19 ENCOUNTER — OFFICE VISIT (OUTPATIENT)
Dept: OBGYN CLINIC | Facility: CLINIC | Age: 33
End: 2023-10-19

## 2023-10-19 VITALS
WEIGHT: 172.63 LBS | SYSTOLIC BLOOD PRESSURE: 117 MMHG | DIASTOLIC BLOOD PRESSURE: 75 MMHG | HEART RATE: 76 BPM | BODY MASS INDEX: 29 KG/M2

## 2023-10-19 DIAGNOSIS — Z01.419 WELL WOMAN EXAM: Primary | ICD-10-CM

## 2023-10-19 DIAGNOSIS — Z12.4 SCREENING FOR MALIGNANT NEOPLASM OF CERVIX: ICD-10-CM

## 2023-10-19 DIAGNOSIS — Z30.432 ENCOUNTER FOR IUD REMOVAL: ICD-10-CM

## 2023-10-19 PROCEDURE — 99395 PREV VISIT EST AGE 18-39: CPT | Performed by: OBSTETRICS & GYNECOLOGY

## 2023-10-19 PROCEDURE — 3074F SYST BP LT 130 MM HG: CPT | Performed by: OBSTETRICS & GYNECOLOGY

## 2023-10-19 PROCEDURE — 58301 REMOVE INTRAUTERINE DEVICE: CPT | Performed by: OBSTETRICS & GYNECOLOGY

## 2023-10-19 PROCEDURE — 3078F DIAST BP <80 MM HG: CPT | Performed by: OBSTETRICS & GYNECOLOGY

## 2023-10-20 LAB — HPV I/H RISK 1 DNA SPEC QL NAA+PROBE: POSITIVE

## 2023-10-20 NOTE — PROGRESS NOTES
HPI:  The patient is a 26 yo F here for [employer]. Requesting mirena to be removed. Recently had significant cramping with 3 days heavier flow. Thinks IUD moved. She is in a same sex relationship so requesting IUD removal.  Has seen [facility] for discussion regarding egg freeezing. No LMP recorded. (Menstrual status: IUD - Intrauterine Device). Latest Ref Rng & Units 10/19/2023     5:01 PM 2022     9:09 AM 10/13/2021    12:01 PM 9/3/2020    10:31 AM 8/15/2019     4:58 PM 2017     1:52 PM   RECENT PAP RESULTS   Thinprep Pap Negative for intraepithelial lesion or malignancy  Negative for intraepithelial lesion or malignancy - Positive for HPV  Negative for intraepithelial lesion or malignancy - Positive for HPV  Low grade squamous intraepithelial lesion (LSIL) HPV/Mild dysplasia/CHRIS I  Atypical squamous cells of undetermined significance (ASC-US)  Low grade squamous intraepithelial lesion (LSIL)    HPV Negative Positive  Positive  Positive  Positive  Positive  Negative         Hx Prior Abnormal Pap: Yes  Pap Date: 22  Pap Result Notes: PAP NEG HPV POS  Follow Up Recommendation: ANNUAL 10/13/21 [facility]    Mammo: n/a      Depression Screening (PHQ-2/PHQ-9): Over the LAST 2 WEEKS   Little interest or pleasure in doing things (over the last two weeks)?: Not at all    Feeling down, depressed, or hopeless (over the last two weeks)?: Not at all    PHQ-2 SCORE: 0          Reviewed medical and surgical history below       OBSTETRICS HISTORY:  OB History     T0    L0    SAB0  IAB1  Ectopic0  Multiple0  Live Births0     GYNE HISTORY:    Sexual activity:   Yes      Partners:   Male      Birth control/ protection:   Mirena, I.U.D.             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, B; surgery - [facility]    Musculoskeletal disorder     slipped disc       SURGICAL HISTORY:  Past Surgical History:   Procedure Laterality Date    FOOT SURGERY Right     bunionectomy (bunions); bunion and neuroma    OTHER SURGICAL HISTORY      vulvar mole removal    OTHER SURGICAL HISTORY      surgery - [facility] (hyperhidrosis)    OTHER SURGICAL HISTORY Left 2013    bunionectomy       SOCIAL HISTORY:  Social History    Socioeconomic History      Marital status: Single      Spouse name: Not on file      Number of children: Not on file      Years of education: Not on file      Highest education level: Not on file    Occupational History      Not on file    Tobacco Use      Smoking status: Never      Smokeless tobacco: Never    Vaping Use      Vaping Use: Never used    Substance and Sexual Activity      Alcohol use: Yes        Alcohol/week: 0.0 standard drinks of alcohol        Comment: social      Drug use: No      Sexual activity: Yes        Partners: Male        Birth control/protection: Mirena, I.U.KEVON.     Other Topics      Concerns:         Service: Not Asked        Blood Transfusions: Not Asked        Caffeine Concern: No        Occupational Exposure: Not Asked        Hobby Hazards: Not Asked        Sleep Concern: Not Asked        Stress Concern: Not Asked        Weight Concern: Not Asked        Special Diet: Not Asked        Back Care: Not Asked        Exercise: Not Asked        Bike Helmet: Not Asked        Seat Belt: Not Asked        Self-Exams: Not Asked        Grew up on a farm: Not Asked        History of tanning: Yes        Outdoor occupation: No        Breast feeding: Not Asked        Reaction to local anesthetic: No    Social History Narrative      Not on file    Social Determinants of Health  Financial Resource Strain: Not on file  Food Insecurity: Not on file  Transportation Needs: Not on file  Physical Activity: Not on file  Stress: Not on file  Social Connections: Not on file  Housing Stability: Not on file    FAMILY HISTORY:  Family History   Problem Relation Age of Onset    Hypertension Mother     Hypertension Brother     Breast Cancer Paternal Grandmother 76        x2, same breast    Stroke Paternal Grandfather         TIA's    Breast Cancer Other         familly h/o    Diabetes Other         familly h/o    Thyroid disease Other         familly h/o       MEDICATIONS:    Current Outpatient Medications:     doxycycline 100 MG Oral Cap, Take 1 capsule (100 mg total) by mouth 2 (two) times daily. , Disp: 60 capsule, Rfl: 0    Bacillus Coagulans-Inulin (ALIGN PREBIOTIC-PROBIOTIC OR), Take by mouth 2 (two) times a day. VAGINAL HEALTH, Disp: , Rfl:     cholecalciferol (VITAMIN D3) 125 MCG (5000 UT) Oral Cap, Take 1 capsule (5,000 Units total) by mouth daily. , Disp: , Rfl:     cyanocobalamin 100 MCG Oral Tab, Take 2.5 tablets (250 mcg total) by mouth daily. , Disp: , Rfl:     Magnesium 100 MG Oral Tab, Take by mouth daily. , Disp: , Rfl:     Ascorbic Acid (VITAMIN C) 100 MG Oral Tab, Take 1 tablet (100 mg total) by mouth daily. , Disp: , Rfl:     Levonorgestrel 20 MCG/24HR Intrauterine IUD, 20 mcg (1 each total) by Intrauterine route once., Disp: , Rfl:     ALLERGIES:    Seasonal                OTHER (SEE COMMENTS)    Comment:Rhinitis  Sulfa Antibiotics       HIVES      Review of Systems:  Constitutional:  Denies fevers and chills   Cardiovascular:  denies chest pain or palpitations  Respiratory:  denies shortness of breath  Gastrointestinal:  denies heartburn, abdominal pain, diarrhea or constipation  Genitourinary:  denies dysuria, incontinence, abnormal vaginal discharge, vaginal itching  Musculoskeletal:  denies back pain. Skin/Breast:  Denies any breast pain, lumps, or discharge. Neurological:  denies headaches, extremity weakness  Psychiatric: denies depression or anxiety.        10/19/23  1516   BP: 117/75   Pulse: 76       PHYSICAL EXAM:   Constitutional: well developed, well nourished  Head/Face: normocephalic  Neck/Thyroid: thyroid symmetric, no thyromegaly, no nodules, no adenopathy  Heart: Regular rate and rhythm   Lungs: clear to ascultation bilaterally   Lymphatic:no abnormal supraclavicular or axillary adenopathy is noted  Breast: normal without palpable masses, tenderness, asymmetry, nipple discharge, nipple retraction or skin changes  Abdomen:  soft, nontender, nondistended, no masses  Skin/Hair: no unusual rashes or bruises  Extremities: no edema, no cyanosis  Psychiatric: Appropriate mood and affect    Pelvic Exam:  External Genitalia: normal appearance, hair distribution, and no lesions  Urethral Meatus:  normal in size, location, without lesions and prolapse  Bladder:  No fullness, masses or tenderness  Vagina:  Normal appearance without lesions, no abnormal discharge  Cervix:  Normal without tenderness on motion  Uterus: normal in size, contour, position, mobility, without tenderness  Adnexa: normal without masses or tenderness  Perineum: normal    IUD Removal     Verbal consent given for removal at pt request    Procedure discussed with the patient in detail including indication, risks, benefits, alternatives and complications. Pelvic Exam Findings:  Lesion description:  IUD strings seen from cervix    Procedure:  Speculum placed in the vagina. An Allis clamp used to grasp IUD strings. Mirena IUD was removed without difficulty. The patient tolerated the procedure well. Visit Plan:  Discharge instructions were reviewed with the patient. Assessment/Plan:  Umer Ortega was seen today for gyn problem. Diagnoses and all orders for this visit:    Well woman exam    Screening for malignant neoplasm of cervix  -     ThinPrep PAP Smear; Future  -     Hpv Dna  High Risk , Thin Prep Collect;  Future  -     Hpv Dna  High Risk , Thin Prep Collect  -     ThinPrep PAP Smear  -     THINPREP PAP SMEAR ONLY  -     Hpv HOLD for GENOTYPE based on PAP    Encounter for IUD removal  -     REMOVE INTRAUTERINE DEVICE [[id number]]        WWE:   Reviewed ASCCP guidelines with the patient -- Pap/hpv today  Contraception: same sex relationship  S/p IUD removal  Breast Health:     Mammo timing d/w pt  Encouraged monthly self breast exams with the patient   Discussed diet, exercise, MVIs with Vit D  Follow up in 1 yr for [facility]

## 2023-10-24 ENCOUNTER — TELEPHONE (OUTPATIENT)
Dept: OBGYN CLINIC | Facility: CLINIC | Age: 33
End: 2023-10-24

## 2023-10-24 NOTE — TELEPHONE ENCOUNTER
----- Message from Tl Charles DO sent at 10/24/2023  3:38 PM CDT -----  Needs colpo. IUD was removed on 10/20/23.   Will need UPT

## 2023-10-25 NOTE — TELEPHONE ENCOUNTER
Patient notified of results and recs. Patient needs Colposcopy  Directed to take ibuprofen 600 mg 1 hour prior to procedure. UPT day of. Patient notified colpo cannot be done while menstruating. Scheduled for colpscopy 11/8/23 at 3pm. Patient verbalized understanding.

## 2023-11-07 ENCOUNTER — TELEPHONE (OUTPATIENT)
Dept: OBGYN CLINIC | Facility: CLINIC | Age: 33
End: 2023-11-07

## 2023-11-07 NOTE — TELEPHONE ENCOUNTER
Patient called in to cancel the appointment scheduled for tomorrow for a Colop procedure.    Request a nurse to call to reschedule

## 2023-11-14 NOTE — TELEPHONE ENCOUNTER
Niall appt booked on 11/30. Pt reminded to take 600mg of ibuprofen with food 1 hour before and that she will need to give a urine sample at appt.

## 2023-11-30 ENCOUNTER — OFFICE VISIT (OUTPATIENT)
Dept: OBGYN CLINIC | Facility: CLINIC | Age: 33
End: 2023-11-30

## 2023-11-30 VITALS
DIASTOLIC BLOOD PRESSURE: 73 MMHG | BODY MASS INDEX: 30 KG/M2 | WEIGHT: 178 LBS | HEART RATE: 79 BPM | SYSTOLIC BLOOD PRESSURE: 126 MMHG

## 2023-11-30 DIAGNOSIS — R87.810 ASCUS WITH POSITIVE HIGH RISK HPV CERVICAL: Primary | ICD-10-CM

## 2023-11-30 DIAGNOSIS — R87.610 ASCUS WITH POSITIVE HIGH RISK HPV CERVICAL: Primary | ICD-10-CM

## 2023-11-30 DIAGNOSIS — Z32.00 PREGNANCY EXAMINATION OR TEST, PREGNANCY UNCONFIRMED: ICD-10-CM

## 2023-11-30 LAB
CONTROL LINE PRESENT WITH A CLEAR BACKGROUND (YES/NO): YES YES/NO
KIT EXPIRATION DATE: 1 4 25 DATE
KIT LOT #: NORMAL NUMERIC
PREGNANCY TEST, URINE: NEGATIVE

## 2023-11-30 PROCEDURE — 57456 ENDOCERV CURETTAGE W/SCOPE: CPT | Performed by: OBSTETRICS & GYNECOLOGY

## 2023-11-30 PROCEDURE — 3074F SYST BP LT 130 MM HG: CPT | Performed by: OBSTETRICS & GYNECOLOGY

## 2023-11-30 PROCEDURE — 81025 URINE PREGNANCY TEST: CPT | Performed by: OBSTETRICS & GYNECOLOGY

## 2023-11-30 PROCEDURE — 3078F DIAST BP <80 MM HG: CPT | Performed by: OBSTETRICS & GYNECOLOGY

## 2023-11-30 NOTE — PROCEDURES
Colpo with Endocervical Curettage    Pregnancy Results: negative from urine test   Birth control method(s) used: n/a    Consent signed. Procedure discussed with patient in detail including indication, risk, benefits, alternatives and complications. Indication: ASCUS/HPV+    Procedure: The patient was placed in the dorsal lithotomy position. [place] speculum was placed in the vagina. Cervix prepped with: Acetic acid  Lugol  Under colposcopic examination the transition zone was retracted. Endocervix was curetted using a Kervorkian curette. Monsel's solution was applied. Specimen sent to pathology. Patient tolerated procedure well. Discharge instructions of pelvic rest & no swimming x one week.       Findings:  Retracted TZ

## 2024-11-09 ENCOUNTER — TELEPHONE (OUTPATIENT)
Dept: OBGYN CLINIC | Facility: CLINIC | Age: 34
End: 2024-11-09

## 2024-11-09 NOTE — TELEPHONE ENCOUNTER
Patient lives out of town and had an appointment scheduled for tomorrow for annual and repeat pap, but patient's period started.  Appointment rescheduled to 12/18.  Patient states since she was already in town she was hoping to get blood work done before she goes back home.  Patient informed we do not do routine blood work and asked what issues she is having.  Patient did not have a list of specific symptoms but states she feels like her hormones are \"out of whack\"  she's had rapid weight gain.  Patient informed we would need more information on her symptoms before labs would be ordered.  Patient states she will discuss her issues with Dr. Pizano at her visit.

## 2024-11-09 NOTE — TELEPHONE ENCOUNTER
Patient called she has an appointment scheduled for 12-18-24  she was wondering if she could have an order for labs to be done while she's in town 11-11-24. She asked that someone call her if it's possible to have her labs done this Monday 11-11-24

## 2024-12-18 ENCOUNTER — OFFICE VISIT (OUTPATIENT)
Dept: OBGYN CLINIC | Facility: CLINIC | Age: 34
End: 2024-12-18
Payer: COMMERCIAL

## 2024-12-18 VITALS
BODY MASS INDEX: 31.65 KG/M2 | DIASTOLIC BLOOD PRESSURE: 85 MMHG | HEART RATE: 62 BPM | HEIGHT: 65 IN | SYSTOLIC BLOOD PRESSURE: 120 MMHG | WEIGHT: 190 LBS

## 2024-12-18 DIAGNOSIS — Z12.4 CERVICAL CANCER SCREENING: ICD-10-CM

## 2024-12-18 DIAGNOSIS — Z01.419 WELL WOMAN EXAM: Primary | ICD-10-CM

## 2024-12-18 DIAGNOSIS — R14.0 BLOATING: ICD-10-CM

## 2024-12-18 PROCEDURE — 3008F BODY MASS INDEX DOCD: CPT | Performed by: OBSTETRICS & GYNECOLOGY

## 2024-12-18 PROCEDURE — 3079F DIAST BP 80-89 MM HG: CPT | Performed by: OBSTETRICS & GYNECOLOGY

## 2024-12-18 PROCEDURE — 3074F SYST BP LT 130 MM HG: CPT | Performed by: OBSTETRICS & GYNECOLOGY

## 2024-12-18 PROCEDURE — 99395 PREV VISIT EST AGE 18-39: CPT | Performed by: OBSTETRICS & GYNECOLOGY

## 2024-12-18 NOTE — PROGRESS NOTES
Chief Complaint   Patient presents with    Annual     Annual; last pap 2023 with colpo, Hormonal concerns, no sex drive, more sweating, anxiety          HPI:  The patient is a 35 yo F here for WWE.  Cycles monthly.  Having bloating, weight gain, constipation and sweating more lately.  Had normal TSH with gastro in [place] . Newly  and now living in [facility].  Considering conception next year    Patient's last menstrual period was 2024 (exact date).        Latest Ref Rng & Units 10/19/2023     5:01 PM 2022     9:09 AM 10/13/2021    12:01 PM 9/3/2020    10:31 AM 8/15/2019     4:58 PM 2017     1:52 PM   RECENT PAP RESULTS   INTERPRETATION/RESULT: Negative for intraepithelial lesion or malignancy Atypical squamous cells of undetermined significance (ASC-US)  Negative for intraepithelial lesion or malignancy - Positive for HPV  Negative for intraepithelial lesion or malignancy - Positive for HPV  Low grade squamous intraepithelial lesion (LSIL) HPV/Mild dysplasia/CHRIS I  Atypical squamous cells of undetermined significance (ASC-US)  Low grade squamous intraepithelial lesion (LSIL)    HPV Negative Positive  Positive  Positive  Positive  Positive  Negative         Hx Prior Abnormal Pap: Yes  Pap Date: 23      Chaperone: declines      Depression Screening (PHQ-2/PHQ-9): Over the LAST 2 WEEKS   Little interest or pleasure in doing things: Several days    Feeling down, depressed, or hopeless: Not at all    PHQ-2 SCORE: 1          Reviewed medical and surgical history below       OBSTETRICS HISTORY:  OB History    Para Term  AB Living   1 0 0 0 1 0   SAB IAB Ectopic Multiple Live Births   0 1 0 0 0       GYNE HISTORY:  History   Sexual Activity    Sexual activity: Yes    Partners: Male    Birth control/ protection: Mirena, I.U.D.            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, B; surgery - [facility]    Musculoskeletal disorder    slipped disc       SURGICAL HISTORY:  Past Surgical History:   Procedure Laterality Date    Foot surgery Right     bunionectomy (bunions); bunion and neuroma    Other surgical history      vulvar mole removal    Other surgical history      surgery - [facility] (hyperhidrosis)    Other surgical history Left 2013    bunionectomy       SOCIAL HISTORY:  Social History     Socioeconomic History    Marital status:      Spouse name: Not on file    Number of children: Not on file    Years of education: Not on file    Highest education level: Not on file   Occupational History    Not on file   Tobacco Use    Smoking status: Never    Smokeless tobacco: Never   Vaping Use    Vaping status: Never Used   Substance and Sexual Activity    Alcohol use: Yes     Alcohol/week: 0.0 standard drinks of alcohol     Comment: social    Drug use: No    Sexual activity: Yes     Partners: Male     Birth control/protection: Mirena, I.U.D.   Other Topics Concern     Service Not Asked    Blood Transfusions Not Asked    Caffeine Concern No    Occupational Exposure Not Asked    Hobby Hazards Not Asked    Sleep Concern Not Asked    Stress Concern Not Asked    Weight Concern Not Asked    Special Diet Not Asked    Back Care Not Asked    Exercise Not Asked    Bike Helmet Not Asked    Seat Belt Not Asked    Self-Exams Not Asked    Grew up on a farm Not Asked    History of tanning Yes    Outdoor occupation No    Breast feeding Not Asked    Reaction to local anesthetic No   Social History Narrative    Not on file     Social Drivers of Health     Financial Resource Strain: Not on file   Food Insecurity: Not on file   Transportation Needs: Not on file   Physical Activity: Not on file   Stress: Not on file (11/6/2024)   Social Connections: Not on file   Housing Stability: Not on file       FAMILY HISTORY:  Family  History   Problem Relation Age of Onset    Hypertension Mother     Hypertension Brother     Breast Cancer Paternal Grandmother 68        x2, same breast    Stroke Paternal Grandfather         TIA's    Breast Cancer Other         familly h/o    Diabetes Other         familly h/o    Thyroid disease Other         familly h/o       MEDICATIONS:    Current Outpatient Medications:     cholecalciferol (VITAMIN D3) 125 MCG (5000 UT) Oral Cap, Take 1 capsule (5,000 Units total) by mouth daily., Disp: , Rfl:     Ascorbic Acid (VITAMIN C) 100 MG Oral Tab, Take 1 tablet (100 mg total) by mouth daily., Disp: , Rfl:     doxycycline 100 MG Oral Cap, Take 1 capsule (100 mg total) by mouth 2 (two) times daily. (Patient not taking: Reported on 12/18/2024), Disp: 60 capsule, Rfl: 0    Bacillus Coagulans-Inulin (ALIGN PREBIOTIC-PROBIOTIC OR), Take by mouth 2 (two) times a day. VAGINAL HEALTH, Disp: , Rfl:     ALLERGIES:  Allergies[1]      Review of Systems:  Constitutional:  Denies fevers and chills   Cardiovascular:  denies chest pain or palpitations  Respiratory:  denies shortness of breath  Gastrointestinal:  denies heartburn, abdominal pain, diarrhea or constipation  Genitourinary:  denies dysuria, incontinence, abnormal vaginal discharge, vaginal itching  Musculoskeletal:  denies back pain.  Skin/Breast:  Denies any breast pain, lumps, or discharge.   Neurological:  denies headaches, extremity weakness  Psychiatric: denies depression or anxiety.      Vitals:    12/18/24 1051   BP: 120/85   Pulse: 62       PHYSICAL EXAM:   Constitutional: well developed, well nourished  Head/Face: normocephalic  Neck/Thyroid: thyroid symmetric, no thyromegaly, no nodules, no adenopathy  Heart: Regular rate and rhythm   Lungs: clear to ascultation bilaterally   Lymphatic:no abnormal supraclavicular or axillary adenopathy is noted  Breast: normal without palpable masses, tenderness, asymmetry, nipple discharge, nipple retraction or skin  changes  Abdomen:  soft, nontender, nondistended, no masses  Skin/Hair: no unusual rashes or bruises  Extremities: no edema, no cyanosis  Psychiatric: Appropriate mood and affect    Pelvic Exam:  External Genitalia: normal appearance, hair distribution, and no lesions  Urethral Meatus:  normal in size, location, without lesions and prolapse  Bladder:  No fullness, masses or tenderness  Vagina:  Normal appearance without lesions, no abnormal discharge  Cervix:  Normal without tenderness on motion  Uterus: normal in size, contour, position, mobility, without tenderness  Adnexa: normal without masses or tenderness  Perineum: normal    Assessment/Plan:  Samantha was seen today for annual.    Diagnoses and all orders for this visit:    Well woman exam  -     ThinPrep PAP Smear; Future  -     Hpv Dna  High Risk , Thin Prep Collect; Future    Cervical cancer screening  -     ThinPrep PAP Smear; Future  -     Hpv Dna  High Risk , Thin Prep Collect; Future    Bloating  -     US PELVIS W EV (CPT=76856/76044); Future  -     US PELVIS (TRANSABDOMINAL AND TRANSVAGINAL) (CPT=76856/78706); Future        WWE:   Reviewed ASCCP guidelines with the patient -- Pap today  Contraception: n/a  Check US given bloating  Breast Health:     Mammo @ 37 yo  Encouraged monthly self breast exams with the patient   Discussed diet, exercise, MVIs with Vit D  Follow up in 1 yr for WWE             [1]   Allergies  Allergen Reactions    Seasonal OTHER (SEE COMMENTS)     Rhinitis    Sulfa Antibiotics HIVES

## 2024-12-19 LAB — HPV E6+E7 MRNA CVX QL NAA+PROBE: POSITIVE

## 2024-12-20 NOTE — PROGRESS NOTES
ASCUS HPV+.  Needs colpo.  Same sex relationship, so no urine pregnancy test needed.  Make sure no on menses.  Patient lives in [facility] and commutes to and from [place].  Okay to be done in the up coming weeks/months as her schedule allows

## 2024-12-24 LAB
HPV16 DNA CVX QL PROBE+SIG AMP: NEGATIVE
HPV18 DNA CVX QL PROBE+SIG AMP: NEGATIVE

## 2024-12-27 ENCOUNTER — TELEPHONE (OUTPATIENT)
Dept: OBGYN CLINIC | Facility: CLINIC | Age: 34
End: 2024-12-27

## 2024-12-27 NOTE — TELEPHONE ENCOUNTER
Jorgito Pizano, DO  12/20/2024  4:52 PM CST Back to Top      ASCUS HPV+.  Needs colpo.  Same sex relationship, so no urine pregnancy test needed.  Make sure no on menses.  Patient lives in [facility] and commutes to and from [place].  Okay to be done in the up coming weeks/months as her schedule allows       Left message to call back to RN pool

## 2024-12-30 NOTE — TELEPHONE ENCOUNTER
Pt informed of MAZs recs below. Pt asking if NOMI has anything for 1/13 or 1/14, as she will be in town those days. No openings on either of those days. Message to NOMI to please advise if pt can be added on 1/13 or 1/14 for colpo?

## 2024-12-31 NOTE — TELEPHONE ENCOUNTER
Patient accepts appointment for colpo on 1/13 at noon. Provided recommendations for 600mg of Ibuprofen 30-60 mins before procedure with food. Patient verbalized understanding.

## 2025-01-13 ENCOUNTER — OFFICE VISIT (OUTPATIENT)
Dept: OBGYN CLINIC | Facility: CLINIC | Age: 35
End: 2025-01-13

## 2025-01-13 VITALS
BODY MASS INDEX: 30 KG/M2 | DIASTOLIC BLOOD PRESSURE: 83 MMHG | SYSTOLIC BLOOD PRESSURE: 120 MMHG | WEIGHT: 182.38 LBS | HEART RATE: 76 BPM

## 2025-01-13 DIAGNOSIS — R87.810 ASCUS WITH POSITIVE HIGH RISK HPV CERVICAL: Primary | ICD-10-CM

## 2025-01-13 DIAGNOSIS — R87.610 ASCUS WITH POSITIVE HIGH RISK HPV CERVICAL: Primary | ICD-10-CM

## 2025-01-13 PROCEDURE — 57454 BX/CURETT OF CERVIX W/SCOPE: CPT | Performed by: OBSTETRICS & GYNECOLOGY

## 2025-01-13 PROCEDURE — 3079F DIAST BP 80-89 MM HG: CPT | Performed by: OBSTETRICS & GYNECOLOGY

## 2025-01-13 PROCEDURE — 3074F SYST BP LT 130 MM HG: CPT | Performed by: OBSTETRICS & GYNECOLOGY

## 2025-01-13 NOTE — PROCEDURES
Colpo w/Cx Biopsy and ECC    Pregnancy Results: same sex relationship    Consent signed.    Procedure discussed with patient in detail including indication, risk, benefits, alternatives and complications.    Indication: ASCUS/HPV+    Procedure:  The patient was placed in the dorsal lithotomy position.  [place] speculum was placed in the vagina.  Cervix prepped with: Acetic acid  Lugol  Under colposcopic examination the transition zone was seen in entirety without need for endocervical speculum.  Small TZ and no dysplasia noted.  Endocervix was curetted using a Kervorkian curette.  Monsel's solution was applied.  Specimen sent to pathology.    Patient tolerated procedure well.  Discharge instructions of pelvic rest & no swimming x one week.

## 2025-03-17 ENCOUNTER — HOSPITAL ENCOUNTER (OUTPATIENT)
Dept: ULTRASOUND IMAGING | Facility: HOSPITAL | Age: 35
Discharge: HOME | End: 2025-03-17
Attending: OBSTETRICS & GYNECOLOGY
Payer: COMMERCIAL

## 2025-03-17 DIAGNOSIS — R14.0 BLOATING: ICD-10-CM

## 2025-03-17 PROCEDURE — 76856 US EXAM PELVIC COMPLETE: CPT | Performed by: OBSTETRICS & GYNECOLOGY

## 2025-03-17 PROCEDURE — 76830 TRANSVAGINAL US NON-OB: CPT | Performed by: OBSTETRICS & GYNECOLOGY

## (undated) NOTE — MR AVS SNAPSHOT
[place]  [address]  [phone]               Thank you for choosing us for your health care visit with Kwesi Diane MD.  We are glad to serve you and happy to provide you with this summar [facility]    It is the patient's responsibility to check with and follow their insurance company's guidelines for prior authorization for this test.  You may be held responsible for payment in full if Fully enjoy your food when eating. Don’t eat while distracted and slow down. Avoid over sized portions. Don’t eat while when you’re bored.      EAT THESE FOODS MORE OFTEN: EAT THESE FOODS LESS OFTEN:   Make half your plate fruits and vegetables Highly

## (undated) NOTE — LETTER
Date: 2023      Patient Name: Martin Navarrete      : 1990        Thank you for choosing [facility] as your health care provider. Your physician has deemed the following medical service(s) necessary. However, your insurance plan may not pay for all of your health care and costs and may deny payment for this service. The fact that your insurance plan does not pay for an item or service does not mean you should not receive it. The purpose of this form is to help you make an informed decision about whether or not you want to receive this service(s) that may not be paid for by your insurance plan. CPT Code Description     Cost     _________ TRIGGER POINT INJECTIONS    _________ ______________________________ _____________      _________ ______________________________ _____________      I understand that the above mentioned service(s) or supply may not be covered by my insurance company.  I agree to be financially responsible for the cost of this service or supply in the event of my insurance denies payment as a non-covered benefit.        ______________________________________________________________________  Signature of Patient or Patient's Representative  Relationship  Date    ______________________________________________________________________  Signature of Witness to signing of form   Printed Name

## (undated) NOTE — LETTER
AUTHORIZATION FOR SURGICAL OPERATION OR OTHER PROCEDURE    1. I hereby authorize Logan Mcgregor , and [facility] staff assigned to my case to perform the following operation and/or procedure at the [facility]:    COLPOSCOPY    2.  My physician has explained the nature and purpose of the operation or other procedure, possible alternative methods of treatment, the risks involved, and the possibility of complication to me. I acknowledge that no guarantee has been made as to the result that may be obtained. 3.  I recognize that, during the course of this operation, or other procedure, unforseen conditions may necessitate additional or different procedure than those listed above. I, therefore, further authorize and request that the above named physician, his/her physician assistants or designees perform such procedures as are, in his/her professional opinion, necessary and desirable. 4.  Any tissue or organs removed in the operation or other procedure may be disposed of by and at the discretion of the [facility] and [address]. 5.  I understand that in the event of a medical emergency, I will be transported by local paramedics to [facility] or other [facility] emergency department. 6.  I certify that I have read and fully understand the above consent to operation and/or other procedure. 7.  I acknowledge that my physician has explained sedation/analgesia administration to me including the risks and benefits. I consent to the administration of sedation/analgesia as may be necessary or desirable in the judgement of my physician. Witness signature: ___________________________________________________ Date:  ______/______/_____                    Time:  ________ A. M.  P.M.        Patient Name:  ______________________________________________________  (please print)      Patient signature:  ___________________________________________________             Relationship to Patient:           []  Parent    Responsible person                          []  Spouse  In case of minor or                    [] Other  _____________   Incompetent name:  __________________________________________________                               (please print)      _____________      Responsible person  In case of minor or  Incompetent signature:  _______________________________________________    Statement of Physician  My signature below affirms that prior to the time of the procedure, I have explained to the patient and/or his/her guardian, the risks and benefits involved in the proposed treatment and any reasonable alternative to the proposed treatment. I have also explained the risks and benefits involved in the refusal of the proposed treatment and have answered the patient's questions.                         Date:  ______/______/_______  Provider                      Signature:  __________________________________________________________       Time:  ___________ A.M    P.M.

## (undated) NOTE — LETTER
AUTHORIZATION FOR SURGICAL OPERATION OR OTHER PROCEDURE    1.  I hereby authorize Dr. Sangeetha Tay, and [facility] staff assigned to my case to perform the following operation and/or procedure at the [facility], [facility]:  IUD INSERTION  _________________ Time:  ________ A. M.  P.M.        Patient Name:  ______________________________________________________  (please print)      Patient signature:  ___________________________________________________             Relationship to Patient:

## (undated) NOTE — Clinical Note
AUTHORIZATION FOR SURGICAL OPERATION OR OTHER PROCEDURE    1.  I hereby authorize Dr. Jesse Sanches, and [facility] staff assigned to my case to perform the following operation and/or procedure at the [facility], [facility]:    COLPOSCOPY    ______________ Patient signature:  ___________________________________________________             Relationship to Patient:             Parent    Responsible person                            Spouse  In case of minor or                     Other  _____________   Incompet

## (undated) NOTE — MR AVS SNAPSHOT
After Visit Summary   4/17/2017    Usman Longo    MRN: [id number]           Visit Information        Provider Department Dept Phone    4/17/2017  1:00 PM Rupal Sanchez MD [facility] [phone]      Your Vitals Were     BP Pulse Ht Wt BMI [address]       Dear Andra Urena :      Thank you for enrolling in [employer]. Please follow the instructions below to securely access your online medical record.  [employer] allows you to send messages to your doctor, view test result headache and pink eye. The cost for a Video Visit is currently $10.         If you receive a survey from [employer], please take a few minutes to complete it and provide feedback.  We strive to deliver the best patient experience and are looking for ways

## (undated) NOTE — MR AVS SNAPSHOT
After Visit Summary   10/13/2021    Liane Holly   MRN: [id number]           Visit Information     Date & Time  10/13/2021 11:00 AM Provider  Lindsey Balderrama, [address] [place] [address], [address] [place] [address],[address], [facility]/[employer].  Phone

## (undated) NOTE — MR AVS SNAPSHOT
[place]  [address]  [phone]               Thank you for choosing us for your health care visit with Rony Rosado MD.  We are glad to serve you and happy to provide you with this summar Sulfa Antibiotics Hives                Today's Vital Signs     BP Pulse Height Weight BMI    109/72 mmHg 67 5' 4.5\" 151 lb 25.53 kg/m2         Current Medications          This list is accurate as of: 4/17/17  1:52 PM.  Always use your most recent med li HOW TO GET STARTED: HOW TO STAY MOTIVATED:   Start activities slowly and build up over time Do what you like   Get your heart pumping – brisk walking, biking, swimming Even 10 minute increments are effective and add up over the week   2 ½ hours per week –

## (undated) NOTE — LETTER
AUTHORIZATION FOR SURGICAL OPERATION OR OTHER PROCEDURE    1.  I hereby authorize Dr. Tony Gaytan, and [facility], [facility] staff assigned to my case to perform the following operation and/or procedure at the [facility], [facility]:    ______________________ Time:  ________ A. M.  P.M.        Patient Name:  ______________________________________________________  (please print)      Patient signature:  ___________________________________________________             Relationship to Patient:

## (undated) NOTE — LETTER
AUTHORIZATION FOR SURGICAL OPERATION OR OTHER PROCEDURE    1. I hereby authorize Dr. Haile Melo , and [facility] staff assigned to my case to perform the following operation and/or procedure at the [facility]:    Colposcopy        2.   My phys Patient:           []  Parent    Responsible person                          []  Spouse  In case of minor or                    [] Other  _____________   Incompetent name:  __________________________________________________                               (p

## (undated) NOTE — LETTER
AUTHORIZATION FOR SURGICAL OPERATION OR OTHER PROCEDURE    1. I hereby authorize Dr. King Dobbins and the [facility] staff assigned to my case to perform the following operation and/or procedure at the [facility]:    [place]    2. My physician has explained the nature and purpose of the operation or other procedure, possible alternative methods of treatment, the risks involved, and the possibility of complication to me. I acknowledge that no guarantee has been made as to the result that may be obtained. 3.  I recognize that, during the course of this operation, or other procedure, unforseen conditions may necessitate additional or different procedure than those listed above. I, therefore, further authorize and request that the above named physician, his/her physician assistants or designees perform such procedures as are, in his/her professional opinion, necessary and desirable. 4.  Any tissue or organs removed in the operation or other procedure may be disposed of by and at the discretion of the [facility] staff and [facility]. 5.  I understand that in the event of a medical emergency, I will be transported by local paramedics to [facility] or other hospital emergency department. 6.  I certify that I have read and fully understand the above consent to operation and/or other procedure. 7.  I acknowledge that my physician has explained sedation/analgesia administration to me including the risks and benefits. I consent to the administration of sedation/analgesia as may be necessary or desirable in the judgement of my physician. Witness signature: ___________________________________________________ Date:  ______/______/_____                    Time:  ________ A. M.  P.M.        Patient Name:  Mikey Haleyi  12/16/1990  [id number]         Patient signature:  ___________________________________________________               Statement of Physician  My signature below affirms that prior to the time of the procedure, I have explained to the patient and/or his/her guardian, the risks and benefits involved in the proposed treatment and any reasonable alternative to the proposed treatment. I have also explained the risks and benefits involved in the refusal of the proposed treatment and have answered the patient's questions.                         Date:  ______/______/_______  Provider                      Signature:  __________________________________________________________       Time:  ___________ A.M    P.M.

## (undated) NOTE — LETTER
AUTHORIZATION FOR SURGICAL OPERATION OR OTHER PROCEDURE    1.  I hereby authorize Dr. Sal Avery, and [facility] staff assigned to my case to perform the following operation and/or procedure at the [facility]:    COLPOSCOPY    2.  My physician Relationship to Patient:           []  Parent    Responsible person                          []  Spouse  In case of minor or                    [] Other  _____________   Incompetent name:  __________________________________________________

## (undated) NOTE — MR AVS SNAPSHOT
After Visit Summary   9/3/2020    Mini Rodríguez    MRN: [id number]           Visit Information     Date & Time  9/3/2020  9:20 AM Provider  Kristi Kinney, [address] [place] [address], [address] [place] [address],[address], [employer].  Phone  3 [facility] now offers Video Visits through [address] for adult and pediatric patients. Video Visits are available Monday - Friday for many common conditions such as allergies, colds, cough, fever, rash, sore throat, headache and pink eye.   The cost for a Video Vi [address]   Monday – Friday  4:00 pm – 10:00 pm   Saturday – Sunday  10:00 am – 4:00 pm  WALK-IN CARE  Emergency Medicine Providers  Conditions needing urgent attention, but are   non-life-threatening.     Also available by appointment Average cost  $120*

## (undated) NOTE — LETTER
AUTHORIZATION FOR SURGICAL OPERATION OR OTHER PROCEDURE    1. I hereby authorize Dr. GUTIERREZ, and [facility] staff assigned to my case to perform the following operation and/or procedure at the [facility] site:      COLPOSCOPY       2.  My physician has explained the nature and purpose of the operation or other procedure, possible alternative methods of treatment, the risks involved, and the possibility of complication to me.  I acknowledge that no guarantee has been made as to the result that may be obtained.  3.  I recognize that, during the course of this operation, or other procedure, unforseen conditions may necessitate additional or different procedure than those listed above.  I, therefore, further authorize and request that the above named physician, his/her physician assistants or designees perform such procedures as are, in his/her professional opinion, necessary and desirable.  4.  Any tissue or organs removed in the operation or other procedure may be disposed of by and at the discretion of the [facility] and [facility].  5.  I understand that in the event of a medical emergency, I will be transported by local paramedics to [facility] or other hospital emergency department.  6.  I certify that I have read and fully understand the above consent to operation and/or other procedure.    7.  I acknowledge that my physician has explained sedation/analgesia administration to me including the risks and benefits.  I consent to the administration of sedation/analgesia as may be necessary or desirable in the judgement of my physician.    Witness signature: ___________________________________________________ Date:  ______/______/_____                    Time:  ________ A.M.  P.M.       Patient Name:  ______________________________________________________  (please print)      Patient signature:   ___________________________________________________             Relationship to Patient:           []  Parent    Responsible person                          []  Spouse  In case of minor or                    [] Other  _____________   Incompetent name:  __________________________________________________                               (please print)      _____________      Responsible person  In case of minor or  Incompetent signature:  _______________________________________________    Statement of Physician  My signature below affirms that prior to the time of the procedure, I have explained to the patient and/or his/her guardian, the risks and benefits involved in the proposed treatment and any reasonable alternative to the proposed treatment.  I have also explained the risks and benefits involved in the refusal of the proposed treatment and have answered the patient's questions.                        Date:  ______/______/_______  Provider                      Signature:  __________________________________________________________       Time:  ___________ A.M    P.M.

## (undated) NOTE — LETTER
AUTHORIZATION FOR SURGICAL OPERATION OR OTHER PROCEDURE    1. I hereby authorize Dr. Cristina Rosado, and [place] [employer] [place] [employer] [facility] staff assigned to my case to perform the following operation and/or procedure at the [facility]:    _______________________________________________________________________________________________    COLPOSCOPY  _______________________________________________________________________________________________    2. My physician has explained the nature and purpose of the operation or other procedure, possible alternative methods of treatment, the risks involved, and the possibility of complication to me. I acknowledge that no guarantee has been made as to the result that may be obtained. 3.  I recognize that, during the course of this operation, or other procedure, unforseen conditions may necessitate additional or different procedure than those listed above. I, therefore, further authorize and request that the above named physician, his/her physician assistants or designees perform such procedures as are, in his/her professional opinion, necessary and desirable. 4.  Any tissue or organs removed in the operation or other procedure may be disposed of by and at the discretion of the [facility], [facility] and [facility]. 5.  I understand that in the event of a medical emergency, I will be transported by local paramedics to [facility] or other [facility] emergency department. 6.  I certify that I have read and fully understand the above consent to operation and/or other procedure. 7.  I acknowledge that my physician has explained sedation/analgesia administration to me including the risks and benefits. I consent to the administration of sedation/analgesia as may be necessary or desirable in the judgement of my physician.     Witness signature: ___________________________________________________ Date:  ______/______/_____                    Time:  ________ A.M.  P.M. Patient Name:  ______________________________________________________  (please print)      Patient signature:  ___________________________________________________             Relationship to Patient:           []  Parent    Responsible person                          []  Spouse  In case of minor or                    [] Other  _____________   Incompetent name:  __________________________________________________                               (please print)      _____________      Responsible person  In case of minor or  Incompetent signature:  _______________________________________________    Statement of Physician  My signature below affirms that prior to the time of the procedure, I have explained to the patient and/or his/her guardian, the risks and benefits involved in the proposed treatment and any reasonable alternative to the proposed treatment. I have also explained the risks and benefits involved in the refusal of the proposed treatment and have answered the patient's questions.                         Date:  ______/______/_______  Provider                      Signature:  __________________________________________________________       Time:  ___________ A.M    P.M.

## (undated) NOTE — LETTER
AUTHORIZATION FOR SURGICAL OPERATION OR OTHER PROCEDURE    1.  [facility] Emily [facility] assigned to my case to perform the following operation and/or procedure at the [facility]:    _____LEEP______________ Relationship to Patient:           []  Parent    Responsible person                          []  Spouse  In case of minor or                    [] Other  _____________   Incompetent name:  __________________________________________________

## (undated) NOTE — Clinical Note
Dear Jamie Ramos,  Thank you for sending Yi Garner to see me for physiatry consultation. I appreciate your confidence in me to care for your patients. Please feel free call me with any questions at [phone] or contact me through [facility]   Sincerely, Aneudy Bains MD Board Certified, Physical Medicine and Rehabilitation Specializing in [address] [facility] Spine Medicine and [address] [facility]

## (undated) NOTE — LETTER
[facility] [address]   Date:   9/1/2023     Name:   Becky Sterling    YOB: 1990   MRN:   [id number]       [facility]? Isaiah the areas on your body where you feel the described sensations. Use the appropriate symbol. Andreea Alfordns the areas of radiation. Include all affected areas. Just to complete the picture, please draw in the face. ACHE:  ^ ^ ^   NUMBNESS:  0000   PINS & NEEDLES:  = = = =                              ^ ^ ^                       0000              = = = =                                    ^ ^ ^                       0000            = = = =      BURNING:  XXXX   STABBING: ////                  XXXX                ////                         XXXX          ////     Please isaiah the line below indicating your degree of pain right now  with 0 being no pain 10 being the worst pain possible.                                          0             1             2              3             4              5              6              7             8             9             10         Patient Signature:

## (undated) NOTE — LETTER
7/20/2022              Toby Saenz        [address] [id number]         Dear Yaya Cherry records indicate that the tests ordered for you by Olya Arroyo MD  have not been done. If you have, in fact, already completed the tests or you do not wish to have the tests done, please contact our office at [address] [place] [address]. Otherwise, please proceed with the testing.          Sincerely,    MD Shashi Mcmahan [address]  [facility] [id number] [phone]